# Patient Record
Sex: MALE | Race: WHITE | NOT HISPANIC OR LATINO | Employment: FULL TIME | URBAN - METROPOLITAN AREA
[De-identification: names, ages, dates, MRNs, and addresses within clinical notes are randomized per-mention and may not be internally consistent; named-entity substitution may affect disease eponyms.]

---

## 2017-07-05 ENCOUNTER — OFFICE VISIT (OUTPATIENT)
Dept: FAMILY MEDICINE | Facility: CLINIC | Age: 52
End: 2017-07-05
Payer: COMMERCIAL

## 2017-07-05 VITALS
WEIGHT: 265 LBS | OXYGEN SATURATION: 97 % | HEART RATE: 101 BPM | DIASTOLIC BLOOD PRESSURE: 88 MMHG | TEMPERATURE: 98.1 F | HEIGHT: 78 IN | BODY MASS INDEX: 30.66 KG/M2 | SYSTOLIC BLOOD PRESSURE: 122 MMHG

## 2017-07-05 DIAGNOSIS — Z80.0 FAMILY HISTORY OF COLON CANCER IN FATHER: ICD-10-CM

## 2017-07-05 DIAGNOSIS — F41.8 SITUATIONAL ANXIETY: Primary | ICD-10-CM

## 2017-07-05 DIAGNOSIS — Z23 NEED FOR PROPHYLACTIC VACCINATION WITH TETANUS-DIPHTHERIA (TD): ICD-10-CM

## 2017-07-05 DIAGNOSIS — Z13.220 LIPID SCREENING: ICD-10-CM

## 2017-07-05 DIAGNOSIS — E29.1 HYPOGONADISM MALE: ICD-10-CM

## 2017-07-05 DIAGNOSIS — Z13.0 SCREENING FOR DEFICIENCY ANEMIA: ICD-10-CM

## 2017-07-05 DIAGNOSIS — Z11.59 NEED FOR HEPATITIS C SCREENING TEST: ICD-10-CM

## 2017-07-05 DIAGNOSIS — I10 BENIGN ESSENTIAL HYPERTENSION: ICD-10-CM

## 2017-07-05 DIAGNOSIS — Z12.11 SCREEN FOR COLON CANCER: ICD-10-CM

## 2017-07-05 LAB
ALBUMIN SERPL-MCNC: 3.9 G/DL (ref 3.4–5)
ALP SERPL-CCNC: 82 U/L (ref 40–150)
ALT SERPL W P-5'-P-CCNC: 57 U/L (ref 0–70)
ANION GAP SERPL CALCULATED.3IONS-SCNC: 10 MMOL/L (ref 3–14)
AST SERPL W P-5'-P-CCNC: 21 U/L (ref 0–45)
BILIRUB SERPL-MCNC: 0.8 MG/DL (ref 0.2–1.3)
BUN SERPL-MCNC: 13 MG/DL (ref 7–30)
CALCIUM SERPL-MCNC: 9.1 MG/DL (ref 8.5–10.1)
CHLORIDE SERPL-SCNC: 107 MMOL/L (ref 94–109)
CHOLEST SERPL-MCNC: 194 MG/DL
CO2 SERPL-SCNC: 24 MMOL/L (ref 20–32)
CREAT SERPL-MCNC: 1 MG/DL (ref 0.66–1.25)
CREAT UR-MCNC: 263 MG/DL
ERYTHROCYTE [DISTWIDTH] IN BLOOD BY AUTOMATED COUNT: 13.9 % (ref 10–15)
GFR SERPL CREATININE-BSD FRML MDRD: 79 ML/MIN/1.7M2
GLUCOSE SERPL-MCNC: 111 MG/DL (ref 70–99)
HCT VFR BLD AUTO: 42.2 % (ref 40–53)
HCV AB SERPL QL IA: NORMAL
HDLC SERPL-MCNC: 42 MG/DL
HGB BLD-MCNC: 14.6 G/DL (ref 13.3–17.7)
LDLC SERPL CALC-MCNC: 122 MG/DL
MCH RBC QN AUTO: 28.9 PG (ref 26.5–33)
MCHC RBC AUTO-ENTMCNC: 34.6 G/DL (ref 31.5–36.5)
MCV RBC AUTO: 84 FL (ref 78–100)
MICROALBUMIN UR-MCNC: 20 MG/L
MICROALBUMIN/CREAT UR: 7.53 MG/G CR (ref 0–17)
NONHDLC SERPL-MCNC: 152 MG/DL
PLATELET # BLD AUTO: 162 10E9/L (ref 150–450)
POTASSIUM SERPL-SCNC: 4.4 MMOL/L (ref 3.4–5.3)
PROT SERPL-MCNC: 7.7 G/DL (ref 6.8–8.8)
RBC # BLD AUTO: 5.05 10E12/L (ref 4.4–5.9)
SODIUM SERPL-SCNC: 141 MMOL/L (ref 133–144)
TRIGL SERPL-MCNC: 151 MG/DL
TSH SERPL DL<=0.005 MIU/L-ACNC: 1.58 MU/L (ref 0.4–4)
WBC # BLD AUTO: 3.9 10E9/L (ref 4–11)

## 2017-07-05 PROCEDURE — 82043 UR ALBUMIN QUANTITATIVE: CPT | Performed by: PHYSICIAN ASSISTANT

## 2017-07-05 PROCEDURE — 84443 ASSAY THYROID STIM HORMONE: CPT | Performed by: PHYSICIAN ASSISTANT

## 2017-07-05 PROCEDURE — 85027 COMPLETE CBC AUTOMATED: CPT | Performed by: PHYSICIAN ASSISTANT

## 2017-07-05 PROCEDURE — 86803 HEPATITIS C AB TEST: CPT | Performed by: PHYSICIAN ASSISTANT

## 2017-07-05 PROCEDURE — 99203 OFFICE O/P NEW LOW 30 MIN: CPT | Performed by: PHYSICIAN ASSISTANT

## 2017-07-05 PROCEDURE — 36415 COLL VENOUS BLD VENIPUNCTURE: CPT | Performed by: PHYSICIAN ASSISTANT

## 2017-07-05 PROCEDURE — 84403 ASSAY OF TOTAL TESTOSTERONE: CPT | Performed by: PHYSICIAN ASSISTANT

## 2017-07-05 PROCEDURE — 80061 LIPID PANEL: CPT | Performed by: PHYSICIAN ASSISTANT

## 2017-07-05 PROCEDURE — 80053 COMPREHEN METABOLIC PANEL: CPT | Performed by: PHYSICIAN ASSISTANT

## 2017-07-05 PROCEDURE — 84270 ASSAY OF SEX HORMONE GLOBUL: CPT | Performed by: PHYSICIAN ASSISTANT

## 2017-07-05 RX ORDER — BUPROPION HYDROCHLORIDE 150 MG/1
150 TABLET ORAL EVERY MORNING
Qty: 90 TABLET | Refills: 1 | Status: SHIPPED | OUTPATIENT
Start: 2017-07-05 | End: 2017-08-19

## 2017-07-05 RX ORDER — SPIRONOLACTONE 50 MG/1
50 TABLET, FILM COATED ORAL DAILY
Qty: 90 TABLET | Refills: 1 | Status: SHIPPED | OUTPATIENT
Start: 2017-07-05 | End: 2017-12-28

## 2017-07-05 RX ORDER — LOSARTAN POTASSIUM 100 MG/1
100 TABLET ORAL DAILY
Qty: 90 TABLET | Refills: 1 | Status: SHIPPED | OUTPATIENT
Start: 2017-07-05 | End: 2017-12-28

## 2017-07-05 ASSESSMENT — PATIENT HEALTH QUESTIONNAIRE - PHQ9: 5. POOR APPETITE OR OVEREATING: NOT AT ALL

## 2017-07-05 ASSESSMENT — ANXIETY QUESTIONNAIRES
7. FEELING AFRAID AS IF SOMETHING AWFUL MIGHT HAPPEN: SEVERAL DAYS
GAD7 TOTAL SCORE: 2
2. NOT BEING ABLE TO STOP OR CONTROL WORRYING: NOT AT ALL
3. WORRYING TOO MUCH ABOUT DIFFERENT THINGS: NOT AT ALL
6. BECOMING EASILY ANNOYED OR IRRITABLE: SEVERAL DAYS
5. BEING SO RESTLESS THAT IT IS HARD TO SIT STILL: NOT AT ALL
1. FEELING NERVOUS, ANXIOUS, OR ON EDGE: NOT AT ALL

## 2017-07-05 NOTE — LETTER
[address] [address]  Phone: [phone]  Fax: [phone]  July 5, 2017     AUTHORIZATION TO RELEASE PROTECTED HEALTH INFORMATION    Patient Name:  Richard Ann  YOB: 1965    Hedy MRN:[id number]             This will authorize [facility]  to request information from :     [facility], [place], [place]    The following information is to be released for health maintenance and continuing care purposes with my primary care clinic:                 Visit Notes     ER Notes    Discharge Summary    -I understand that I may revoke this authorization by written request at any time to the address listed at the top of this form.  I understand that the revocation will not apply to information that has already been released in response to this authorization.    -This authorization last for one year after the date you sign it.     -[place] cannot prevent redisclosure of the information by the person or organization who receives your records under this authorization, and that information may not be covered by state and federal privacy protections after it is released. By signing this authorization, you release [place] from any and all liability resulting from a redisclosure by the recipient.    ___________________________________          _____________  Signature of Patient/Authorized Person                     Date        ____________________________________________  (Reason if patient is unable to sign)

## 2017-07-05 NOTE — PROGRESS NOTES
SUBJECTIVE:                                                    Richard Ann is a 51 year old male who presents to clinic today for the following health issues:    Hypertension Follow-up  Richard presents to clinic today to establish care. He recently relocated from [place] to the [facility]. He has historically taken losartan and spironolactone daily as prescribed to manage symptoms. States today with recent move he ran out of his medications and has not been taking medications for past ~1 week.     Outpatient blood pressures are not being checked.    Low Salt Diet: not monitoring salt  BP Readings from Last 3 Encounters:   07/05/17 122/88       Depression and Anxiety Follow-Up  Patient has PMH significant for depression. He has historically taken bupropion to manage depression for past ~2 years while going through a divorce. He reports that he is interested in stopping medication at this time. States prior to starting medication he was suffering from severe emotional swings with constant crying spells throughout the day.     Other associated symptoms:None    Complicating factors:     Significant life event: Yes-  Recent divorce     Current substance abuse: None    No flowsheet data found.  DANNIE-7 SCORE 7/5/2017   Total Score 2       PHQ-9  English  PHQ-9   Any Language  GAD7    Hormone Therapy  Per patient he has PMH significant for low testosterone. Prior to starting hormone therapy his testosterone levels were in the mid 50's. He reports that he was on hormone therapies for a brief time to improve levels. Hoping to have levels checked again today to see if he needs to restart hormones.      Altitude Poisoning   Per patient approximately ~1 month ago he was seen at an ED in [place], [place] for altitude poisoning. Patient reports that he was in [place] for work when he become symptomatic and was transported by ambulance to ED. He was treated with O2 at ED.     Of mention, the patient reports family history in father  for colon CA. Father was diagnosed at 72 y.o. and passed at 73 y.o. due to complications from CA. Today patient is requesting colonoscopy.       Amount of exercise or physical activity: 2-3 days/week for an average of 15-30 minutes    Problems taking medications regularly: No    Medication side effects: none    Diet: regular (no restrictions)     Problem list and histories reviewed & adjusted, as indicated.  Additional history: as documented    Patient Active Problem List   Diagnosis     Family history of colon cancer in father     Hypogonadism male     Benign essential hypertension     Situational anxiety     Past Surgical History:   Procedure Laterality Date     NO HISTORY OF SURGERY         Social History   Substance Use Topics     Smoking status: Never Smoker     Smokeless tobacco: Not on file     Alcohol use Yes      Comment: 4 drinks per week     Family History   Problem Relation Age of Onset     Colon Cancer Father 72      one year after diagnosis         Current Outpatient Prescriptions   Medication Sig Dispense Refill     losartan (COZAAR) 100 MG tablet Take 1 tablet (100 mg) by mouth daily 90 tablet 1     spironolactone (ALDACTONE) 50 MG tablet Take 1 tablet (50 mg) by mouth daily 90 tablet 1     buPROPion (WELLBUTRIN XL) 150 MG 24 hr tablet Take 1 tablet (150 mg) by mouth every morning 90 tablet 1     No Known Allergies    Reviewed and updated as needed this visit by clinical staff  Tobacco  Allergies  Meds  Problems  Med Hx  Surg Hx  Fam Hx  Soc Hx        Reviewed and updated as needed this visit by Provider  Tobacco  Allergies  Meds  Problems  Med Hx  Surg Hx  Fam Hx  Soc Hx          ROS:  Constitutional, HEENT, cardiovascular, pulmonary, GI, , musculoskeletal, neuro, skin, endocrine and psych systems are negative, except as otherwise noted.    This document serves as a record of the services and decisions personally performed and made by Jenise Acosta PA-C. It was created on her  "behalf by Shani Le, a trained medical scribe. The creation of this document is based the provider's statements to the medical scribe.  Shani Le, July 5, 2017 8:38 AM    OBJECTIVE:     /88 (BP Location: Right arm, Patient Position: Chair, Cuff Size: Adult Large)  Pulse 101  Temp 98.1  F (36.7  C) (Oral)  Ht 6' 6.5\" (1.994 m)  Wt 265 lb (120.2 kg)  SpO2 97%  BMI 30.23 kg/m2  Body mass index is 30.23 kg/(m^2).     GENERAL: healthy, alert and no distress  RESP: lungs clear to auscultation - no rales, rhonchi or wheezes  CV: regular rate and rhythm, normal S1 S2, no S3 or S4, no murmur, click or rub, no peripheral edema and peripheral pulses strong  NEURO: Normal strength and tone, mentation intact and speech normal  PSYCH: mentation appears normal, affect normal/bright    Diagnostic Test Results:  Results for orders placed or performed in visit on 07/05/17 (from the past 24 hour(s))   CBC with platelets   Result Value Ref Range    WBC 3.9 (L) 4.0 - 11.0 10e9/L    RBC Count 5.05 4.4 - 5.9 10e12/L    Hemoglobin 14.6 13.3 - 17.7 g/dL    Hematocrit 42.2 40.0 - 53.0 %    MCV 84 78 - 100 fl    MCH 28.9 26.5 - 33.0 pg    MCHC 34.6 31.5 - 36.5 g/dL    RDW 13.9 10.0 - 15.0 %    Platelet Count 162 150 - 450 10e9/L       ASSESSMENT/PLAN:   Richard was seen today for recheck medication.    Diagnoses and all orders for this visit:    Situational anxiety  Patient reports that he is stable and doing well. Interested in going off medication. Refilled medication today and discussed with patient going off medication to see how he does and if mood not well controlled ok to restart medication.   -     buPROPion (WELLBUTRIN XL) 150 MG 24 hr tablet; Take 1 tablet (150 mg) by mouth every morning    Benign essential hypertension  Refilled patient's medications today. He reports medications work well for him without side effect. Will follow up pending lab results.   -     losartan (COZAAR) 100 MG tablet; Take 1 tablet (100 " mg) by mouth daily  -     spironolactone (ALDACTONE) 50 MG tablet; Take 1 tablet (50 mg) by mouth daily  -     Comprehensive metabolic panel  -     Albumin Random Urine Quantitative    Lipid screening - will follow up pending lab results via [facility]  -     Lipid panel reflex to direct LDL    Screening for deficiency anemia  -     CBC with platelets    Hypogonadism male  Per patient he has history significant for low testosterone levels. Will follow up pending lab results.   -     Testosterone Free and Total  -     TSH with free T4 reflex    Family history of colon cancer in father, Screen for colon cancer  Sent patient for colonoscopy given family history for CA.  -     GASTROENTEROLOGY ADULT REF PROCEDURE ONLY    Need for hepatitis C screening test  -     Hepatitis C Screen Reflex to HCV RNA Quant and Genotype    Need for prophylactic vaccination with tetanus-diphtheria (TD)   - Will get records    The information in this document, created by the medical scribe for me, accurately reflects the services I personally performed and the decisions made by me. I have reviewed and approved this document for accuracy prior to leaving the patient care area .  Jenise Acosta PA-C July 5, 2017 8:37 AM    Jenise Acosta PA-C  [facility]

## 2017-07-05 NOTE — NURSING NOTE
"Chief Complaint   Patient presents with     Recheck Medication       Initial /88 (BP Location: Right arm, Patient Position: Chair, Cuff Size: Adult Large)  Pulse 101  Temp 98.1  F (36.7  C) (Oral)  Ht 6' 6.5\" (1.994 m)  Wt 265 lb (120.2 kg)  SpO2 97%  BMI 30.23 kg/m2 Estimated body mass index is 30.23 kg/(m^2) as calculated from the following:    Height as of this encounter: 6' 6.5\" (1.994 m).    Weight as of this encounter: 265 lb (120.2 kg).  Medication Reconciliation: complete   Csaba Mlnarik CMA    "

## 2017-07-06 ASSESSMENT — ANXIETY QUESTIONNAIRES: GAD7 TOTAL SCORE: 2

## 2017-07-07 ENCOUNTER — TELEPHONE (OUTPATIENT)
Dept: FAMILY MEDICINE | Facility: CLINIC | Age: 52
End: 2017-07-07

## 2017-07-07 LAB
SHBG SERPL-SCNC: 17 NMOL/L (ref 11–80)
TESTOST FREE SERPL-MCNC: 5.2 NG/DL (ref 4.7–24.4)
TESTOST SERPL-MCNC: 197 NG/DL (ref 240–950)

## 2017-07-07 NOTE — PROGRESS NOTES
Richard  I have reviewed your recent labs. Here are the results:    -Liver and gallbladder tests (ALT,AST, Alk phos,bilirubin) are normal.  -Kidney function (GFR) is normal.  -Sodium is normal.  -Potassium is normal.  -Glucose is slight elevated and may be sign of early diabetes (prediabetes). ADVISE:: low carbohydrate diet, exercise, try to lose weight (if necessary) and recheck glucose in 12 months. (GLU,A1C, DX: prediabetes)  -LDL(bad) cholesterol level is normal.  -HDL(good) cholesterol level is normal.  -Triglycerides are elevated which can increase your heart disease risk.  A diet high in fat and simple carbohydrates, genetics and being overweight can contribute to this. ADVISE: Exercise, weight control, and omega-3 fatty acids (fish oil) 3297-5670 mg daily are helpful to improve this.  Rechecking your cholesterol in 12 months is recommended (lipid w/ LDL reflex, DX: hypertriglyceridemia - 272.1).  -TSH (thyroid stimulating hormone) level is normal which indicates normal thyroid function.  -Normal red blood cell (hgb) levels, normal white blood cell count and normal platelet levels.  -Hepatitis C antibody screen test shows no signs of a previous hepatitis C infection.  -Microalbumin (urine protein) test is normal.  ADVISE: recheck annually  -Testosterone is low, do you happen to remember what dose you were previously on?  Did you do injections or topical formulations.  If you wanted to do injections, would you want to come in to the clinic to have them done or would you like to do them at home?  (If you want them done in the clinic you would need to use our on-site pharmacy)    For additional lab test information, labtestsonline.org is an excellent reference.      If you have any questions please do not hesitate to contact our office via phone ([phone]) or MyChart.    Jenise Acosta, MS, PASanjivC  [facility]

## 2017-07-21 ENCOUNTER — TELEPHONE (OUTPATIENT)
Dept: FAMILY MEDICINE | Facility: CLINIC | Age: 52
End: 2017-07-21

## 2017-07-21 DIAGNOSIS — T70.29XA ACUTE MOUNTAIN SICKNESS: Primary | ICD-10-CM

## 2017-07-21 DIAGNOSIS — D75.1 ACUTE MOUNTAIN SICKNESS: Primary | ICD-10-CM

## 2017-07-21 NOTE — TELEPHONE ENCOUNTER
Reason for Call:  Other call back    Detailed comments: Patient states that he is traveling to [place], [place], on Sunday for business.  He states last time he was in [place] he was rushed to the ER with altitude poisoning, and the the ER told him this may have been due to his medications.    He would like to talk to a nurse regarding this.    Additionally patient would like to talk to the nurse regarding his testosterone therapy.  Please call patient back to discuss.    Phone Number Patient can be reached at: Cell number on file:    Telephone Information:   Mobile [phone]       Best Time: Any      Can we leave a detailed message on this number? yes    Call taken on 7/21/2017 at 11:11 AM by Fior Moreno

## 2017-07-21 NOTE — TELEPHONE ENCOUNTER
RN spoke with RL and he advised he could try Diamox 125 mg BID starting 24 hours before strip and stop 2-3 days reaching peak altitude. RL okayed #10 verbally.    RN called patient to advised on recommendations.  He said he would rather not have to take anymore pills if he doesn't need to. He said he has gone to elevations of 14,000 feet with no issues.  He thinks he was dehydrated and not feeling well due to something he ate last time.  He said he will push fluids to stay hydrated and see how it goes.    His previous PCP was Dr. Mendez at [facility] in [place] at ph) [phone] so we can call on Monday to verify testosterone dose.  KARUNA for this clinic has not been scanned in to chart.    He said he travels Monday through Thursday and is home on Fridays.  Can reach him on his cell at anytime.      JOSELYN Priest, RN, [facility] Triage  ) [phone]

## 2017-07-21 NOTE — TELEPHONE ENCOUNTER
RN spoke with patient.   He said that he was he was in [place] a few months ago and developed altitude poisoning and was rushed to the ER.  He said they advised him it could have been a combination of things including his medications.    He wants to know what he can do to prevent this as he is traveling to [place] again this Sunday.    He also notes he was advised to update on testosterone therapy and he would like to get injections again as that is what he was getting in [place].      Penny Townsend, JOSELYN, RN, N  [facility]) [phone]

## 2017-07-21 NOTE — TELEPHONE ENCOUNTER
Please advise on altitude sickness prevention for trip this weekend.    I will check on KARUNA.    Thank you,    Penny Townsend, JOSELYN, RN, PHN  [facility]  Ph) [phone]

## 2017-07-22 RX ORDER — ACETAZOLAMIDE 125 MG/1
125 TABLET ORAL 2 TIMES DAILY
Qty: 10 TABLET | Refills: 0 | Status: SHIPPED | OUTPATIENT
Start: 2017-07-22 | End: 2017-08-19

## 2017-07-22 NOTE — TELEPHONE ENCOUNTER
Reason for Call:  Medication or medication refill:    Do you use a [facility]?  Name of the pharmacy and phone number for the current request:  [facility] - [phone]    Name of the medication requested: Diamox 125 mg for altitude sickness    Other request: would like today as he is going to [place] tomorrow    Can we leave a detailed message on this number? YES    Phone number patient can be reached at: Cell number on file:    Telephone Information:   Mobile [phone]       Best Time: any    Call taken on 7/22/2017 at 9:31 AM by Annika Fernandez

## 2017-07-22 NOTE — TELEPHONE ENCOUNTER
Chart reviewed.  Rx sent to pt's preferred pharmacy.    [facility] [address] [facility] [address] AT [facility] [address]    Alex Duarte MD

## 2017-08-03 ENCOUNTER — HOSPITAL ENCOUNTER (OUTPATIENT)
Facility: CLINIC | Age: 52
Discharge: HOME | End: 2017-08-03
Attending: INTERNAL MEDICINE | Admitting: INTERNAL MEDICINE
Payer: COMMERCIAL

## 2017-08-03 VITALS
SYSTOLIC BLOOD PRESSURE: 127 MMHG | OXYGEN SATURATION: 92 % | RESPIRATION RATE: 19 BRPM | DIASTOLIC BLOOD PRESSURE: 85 MMHG

## 2017-08-03 LAB — COLONOSCOPY: NORMAL

## 2017-08-03 PROCEDURE — G0500 MOD SEDAT ENDO SERVICE >5YRS: HCPCS | Performed by: INTERNAL MEDICINE

## 2017-08-03 PROCEDURE — 88305 TISSUE EXAM BY PATHOLOGIST: CPT | Mod: 26 | Performed by: INTERNAL MEDICINE

## 2017-08-03 PROCEDURE — 45385 COLONOSCOPY W/LESION REMOVAL: CPT | Performed by: INTERNAL MEDICINE

## 2017-08-03 PROCEDURE — 25000128 H RX IP 250 OP 636: Performed by: INTERNAL MEDICINE

## 2017-08-03 PROCEDURE — 88305 TISSUE EXAM BY PATHOLOGIST: CPT | Performed by: INTERNAL MEDICINE

## 2017-08-03 RX ORDER — FENTANYL CITRATE 50 UG/ML
INJECTION, SOLUTION INTRAMUSCULAR; INTRAVENOUS PRN
Status: DISCONTINUED | OUTPATIENT
Start: 2017-08-03 | End: 2017-08-03 | Stop reason: HOSPADM

## 2017-08-03 RX ORDER — ONDANSETRON 4 MG/1
4 TABLET, ORALLY DISINTEGRATING ORAL EVERY 6 HOURS PRN
Status: DISCONTINUED | OUTPATIENT
Start: 2017-08-03 | End: 2017-08-03 | Stop reason: HOSPADM

## 2017-08-03 RX ORDER — LIDOCAINE 40 MG/G
CREAM TOPICAL
Status: DISCONTINUED | OUTPATIENT
Start: 2017-08-03 | End: 2017-08-03 | Stop reason: HOSPADM

## 2017-08-03 RX ORDER — FLUMAZENIL 0.1 MG/ML
0.2 INJECTION, SOLUTION INTRAVENOUS
Status: DISCONTINUED | OUTPATIENT
Start: 2017-08-03 | End: 2017-08-03 | Stop reason: HOSPADM

## 2017-08-03 RX ORDER — ONDANSETRON 2 MG/ML
4 INJECTION INTRAMUSCULAR; INTRAVENOUS
Status: DISCONTINUED | OUTPATIENT
Start: 2017-08-03 | End: 2017-08-03 | Stop reason: HOSPADM

## 2017-08-03 RX ORDER — ONDANSETRON 2 MG/ML
4 INJECTION INTRAMUSCULAR; INTRAVENOUS EVERY 6 HOURS PRN
Status: DISCONTINUED | OUTPATIENT
Start: 2017-08-03 | End: 2017-08-03 | Stop reason: HOSPADM

## 2017-08-03 RX ORDER — NALOXONE HYDROCHLORIDE 0.4 MG/ML
.1-.4 INJECTION, SOLUTION INTRAMUSCULAR; INTRAVENOUS; SUBCUTANEOUS
Status: DISCONTINUED | OUTPATIENT
Start: 2017-08-03 | End: 2017-08-03 | Stop reason: HOSPADM

## 2017-08-03 NOTE — LETTER
July 11, 2017              Dear Richard ROWELL    Thank you for choosing [facility]. You are scheduled for the following service.     Date:  August 3rd, 2017 - Thursday             Procedure:  COLONOSCOPY  Doctor:        Sohan Patel   Arrival Time:  7:30 am  *check in at Emergency/Endoscopy desk*  Procedure Time:  8:00 am    Location:   [facility]        Endoscopy Department, First Floor (Enter through ER Doors) *        [address] [place], [place] [address]      [phone] or [phone] () to reschedule        Colonoscopy is the most accurate test to detect colon polyps and colon cancer; and the only test where polyps can be removed. During this procedure, a doctor examines the lining of your large intestine and rectum through a flexible tube.     Transportation  Arrange for a ride for the day of your procedure with a responsible adult.  A taxi ride is not an option unless you are accompanied by a responsible adult. If you fail to arrange transportation with a responsible adult, your procedure will be cancelled and rescheduled.    MIRALAX -GATORADE  PREP    Purchase the following supplies at your local pharmacy:  - 2 (two) bisacodyl tablets: each tablet contains 5 mg.  (Dulcolax  laxative NOT Dulcolax  stool softener)   - 1 (one) 8.3 oz bottle of Polyethylene Glycol (PEG) 3350 Powder   (MiraLAX , Smooth LAX , ClearLAX  or equivalent)  - 64 oz Gatorade    Regular Gatorade, Gatorade G2 , Powerade , Powerade Zero  or Pedialyte  is acceptable. Red colored flavors are not allowed; all other colors (yellow, green, orange, purple and blue) are okay. It is also okay to buy two 2.12 oz packets of powdered Gatorade that can be mixed with water to a total volume of 64 oz of liquid.  - 1 (one) 10 oz bottle of Magnesium Citrate (Red colored flavors are not allowed)  It is also okay for you to use a 0.5 oz package of powdered magnesium citrate (17 g) mixed with  10 oz of water.      PREPARATION FOR COLONOSCOPY    7 days before:    Discontinue fiber supplements and medications containing iron. This includes Metamucil  and Fibercon ; and multivitamins with iron.  3 days before:    Begin a low-fiber diet. A low-fiber diet helps making the cleanout more effective.     Examples of a low-fiber diet include (but are not limited to): white bread, white rice, pasta, crackers, fish, chicken, eggs, ground beef, creamy peanut butter, cooked/steamed/boiled vegetables, canned fruit, bananas, melons, milk, plain yogurt cheese, salad dressing and other condiments.     The following are not allowed on a low-fiber diet: seeds, nuts, popcorn, bran, whole wheat, corn, quinoa, raw fruits and vegetables, berries and dried fruit, beans and lentils.    For additional details on low-fiber diet, please refer to the table on the last page.  2 days before:    Continue the low-fiber diet.     Drink at least 8 glasses of water throughout the day.     Stop eating solid foods at 11:45 pm.    1 day before:    In the morning: begin a clear liquid diet (liquids you can see through).     Examples of a clear liquid diet include: water, clear broth or bouillon, Gatorade, Pedialyte or Powerade, carbonated and non-carbonated soft drinks (Sprite , 7-Up , ginger ale), strained fruit juices without pulp (apple, white grape, white cranberry), Jell-O  and popsicles.     The following are not allowed on a clear liquid diet: red liquids, alcoholic beverages, coffee, dairy products (milk, creamer, and yogurt), protein shakes, creamy broths, juice with pulp and chewing tobacco.    At noon: take 2 (two) bisacodyl tablets     At 4 (and no later than 6pm): start drinking the Miralax-Gatorade preparation (8.3 oz of Miralax mixed with 64 oz of Gatorade in a large pitcher). Drink 1(one) 8 oz glass every 15 minutes thereafter, until the mixture is gone.    COLON CLEANSING TIPS: drink adequate amounts of fluids before and after  your colon cleansing to prevent dehydration. Stay near a toilet because you will have diarrhea. Even if you are sitting on the toilet, continue to drink the cleansing solution every 15 minutes. If you feel nauseous or vomit, rinse your mouth with water, take a 15 to 30-minute-break and then continue drinking the solution. You will be uncomfortable until the stool has flushed from your colon (in about 2 to 4 hours). You may feel chilled.    Day of your procedure  You may take all of your morning medications including blood pressure medications, blood thinners (if you have not been instructed to stop these by our office), methadone, anti-seizure medications with sips of water 3 hours prior to your procedure or earlier. Do not take insulin or vitamins prior to your procedure. Continue the clear liquid diet.   4 hours prior: drink 10 oz of magnesium citrate. It may be easier to drink it with a straw.    STOP consuming all liquids after that.     Do not take anything by mouth during this time.     Allow extra time to travel to your procedure as you may need to stop and use a restroom along the way.  You are ready for the procedure, if you followed all instructions and your stool is no longer formed, but clear or yellow liquid. If you are unsure whether your colon is clean, please call our office at [phone] before you leave for your appointment.  Bring the following to your procedure:  - Insurance Card/Photo ID.   - List of current medications including over-the-counter medications and supplements.   - Your rescue inhaler if you currently use one to control asthma.      Canceling or rescheduling your appointment:   If you must cancel or reschedule your appointment, please call [phone] as soon as possible.    COLONOSCOPY PRE-PROCEDURE CHECKLIST  If you have diabetes, ask your regular doctor for diet and medication restrictions.  If you take an anticoagulant or anti-platelet medication (such as Coumadin ,  Lovenox , Pradaxa , Xarelto , Eliquis , etc.), please call your primary doctor for advice on holding this medication.  If you take aspirin you may continue to do so.  If you are or may be pregnant, please discuss the risks and benefits of this procedure with your doctor.        What happens during a colonoscopy?    Plan to spend up to two hours, starting at registration time, at the endoscopy center the day of your procedure. The colonoscopy takes an average of 15 to 30 minutes. Recovery time is about 30 minutes.    Before the exam:    You will change into a gown.    Your medical history and medication list will be reviewed with you, unless that has been done over the phone prior to the procedure.     A nurse will insert an intravenous (IV) line into your hand or arm.    The doctor will meet with you and will give you a consent form to sign.    During the exam:     Medicine will be given through the IV line to help you relax.     Your heart rate and oxygen levels will be monitored. If your blood pressure is low, you may be given fluids through the IV line.     The doctor will insert a flexible hollow tube, called a colonoscope, into your rectum. The scope will be advanced slowly through the large intestine (colon).    You may have a feeling of fullness or pressure.     If an abnormal tissue or a polyp is found, the doctor may remove it through the endoscope for closer examination, or biopsy. Tissue removal is painless    After the exam:           Any tissue samples removed during the exam will be sent to a lab for evaluation. It may take 5-7 working days for you to be notified of the results.     A nurse will provide you with complete discharge instructions before you leave the endoscopy center. Be sure to ask the nurse for specific instructions if you take blood thinners such as Aspirin, Coumadin or Plavix.     The doctor will prepare a full report for you and for the physician who referred you for the procedure.      Your doctor will talk with you about the initial results of your exam.      Medication given during the exam will prohibit you from driving for the rest of the day.     Following the exam, you may resume your normal diet. Your first meal should be light, no greasy foods. Avoid alcohol until the next day.     You may resume your regular activities the day after the procedure.                   LOW-FIBER DIET    Foods RECOMMENDED Foods to AVOID   Breads, Cereal, Rice and Pasta:   White bread, rolls, biscuits, croissant and giuliano toast.   Waffles, Bruneian toast and pancakes.   White rice, noodles, pasta, macaroni and peeled cooked potatoes.   Plain crackers and saltines.   Cooked cereals: farina, cream of rice.   Cold cereals: Puffed Rice , Rice Krispies , Corn Flakes  and Special K    Breads, Cereal, Rice and Pasta:   Breads or rolls with nuts, seeds or fruit.   Whole wheat, pumpernickel, rye breads and cornbread.   Potatoes with skin, brown or wild rice, and kasha (buckwheat).     Vegetables:   Tender cooked and canned vegetables without seeds: carrots, asparagus tips, green or wax beans, pumpkin, spinach, lima beans. Vegetables:   Raw or steamed vegetables.   Vegetables with seeds.   Sauerkraut.   Winter squash, peas, broccoli, Brussel sprouts, cabbage, onions, cauliflower, baked beans, peas and corn.   Fruits:   Strained fruit juice.   Canned fruit, except pineapple.   Ripe bananas and melon. Fruits:   Prunes and prune juice.   Raw fruits.   Dried fruits: figs, dates and raisins.   Milk/Dairy:   Milk: plain or flavored.   Yogurt, custard and ice cream.   Cheese and cottage cheese Milk/Dairy:     Meat and other proteins:   ground, well-cooked tender beef, lamb, ham, veal, pork, fish, poultry and organ meats.   Eggs.   Peanut butter without nuts. Meat and other proteins:   Tough, fibrous meats with gristle.   Dry beans, peas and lentils.   Peanut butter with nuts.   Tofu.   Fats, Snack, Sweets, Condiments and  Beverages:   Margarine, butter, oils, mayonnaise, sour cream and salad dressing, plain gravy.   Sugar, hard candy, clear jelly, honey and syrup.   Spices, cooked herbs, bouillon, broth and soups made with allowed vegetable, ketchup and mustard.   Coffee, tea and carbonated drinks.   Plain cakes, cookies and pretzels.   Gelatin, plain puddings, custard, ice cream, sherbet and popsicles. Fats, Snack, Sweets, Condiments and Beverages:   Nuts, seeds and coconut.   Jam, marmalade and preserves.   Pickles, olives, relish and horseradish.   All desserts containing nuts, seeds, dried fruit and coconut; or made from whole grains or bran.   Candy made with nuts or seeds.   Popcorn.           DIRECTIONS TO THE ENDOSCOPY DEPARTMENT     From the north ([facility])  Take [address] South, exit on [address] [address]. Get into the left hand isela, turn left (east), go one-half mile to [address] and turn left. Go north to the first stoplight, take a right on [address] and follow it to the Emergency entrance.    From the south ([facility])  Take 35N to the 35E split and exit on [address] [address]. On [address] , turn left (west) to [address]. Turn right (north) on [address]. Go north to the first stoplight, take a right on [address] and follow it to the Emergency entrance.    From the east via 35E ([facility])  Take [address]E south to [address] [address] exit. Turn right on [address] . Go west to [address]. Turn right (north) on [address]. Go to the first stoplight, take a right and follow on [address] to the Emergency entrance.    From the east via [address] ([facility])  Take [address] to [address]. Turn left (south) on [address] to [address]. Turn left (east) on [address] and follow it to the Emergency entrance.    From the west via Highway 13 ([place])  Take [address] east to [address]. Turn right (south) on  [address] to [address]. Turn left (east) on [address] and follow it to the Emergency entrance.

## 2017-08-03 NOTE — H&P
Pre-Endoscopy History and Physical     Richard Ann MRN# [id number]   YOB: 1965 Age: 51 year old     Date of Procedure: 8/3/2017  Primary care provider: No primary care provider on file.  Type of Endoscopy: Colonoscopy with possible biopsy, possible polypectomy  Reason for Procedure: screen  Type of Anesthesia Anticipated: Conscious Sedation    HPI:    Richard is a 51 year old male who will be undergoing the above procedure.      A history and physical has been performed. The patient's medications and allergies have been reviewed. The risks and benefits of the procedure and the sedation options and risks were discussed with the patient.  All questions were answered and informed consent was obtained.      He denies a personal or family history of anesthesia complications or bleeding disorders.     Patient Active Problem List   Diagnosis     Family history of colon cancer in father     Hypogonadism male     Benign essential hypertension     Situational anxiety        Past Medical History:   Diagnosis Date     Benign essential hypertension 2017     Family history of colon cancer in father 2017     Hypogonadism male 2017    [place], has been off x 1 year (as of 2017)     Situational anxiety 2017    due to divorce        Past Surgical History:   Procedure Laterality Date     NO HISTORY OF SURGERY         Social History   Substance Use Topics     Smoking status: Never Smoker     Smokeless tobacco: Never Used     Alcohol use Yes      Comment: 4 drinks per week       Family History   Problem Relation Age of Onset     Colon Cancer Father 72      one year after diagnosis       Prior to Admission medications    Medication Sig Start Date End Date Taking? Authorizing Provider   Omeprazole (PRILOSEC PO) Take 20 mg by mouth   Yes Reported, Patient   losartan (COZAAR) 100 MG tablet Take 1 tablet (100 mg) by mouth daily 17  Yes Jenise Acosta PA-C  "  spironolactone (ALDACTONE) 50 MG tablet Take 1 tablet (50 mg) by mouth daily 7/5/17  Yes Jenise Acosta PA-C   acetaZOLAMIDE (DIAMOX) 125 MG tablet Take 1 tablet (125 mg) by mouth 2 times daily Start day before ascending to higher altitude 7/22/17   Alex Duarte Jr., MD   buPROPion (WELLBUTRIN XL) 150 MG 24 hr tablet Take 1 tablet (150 mg) by mouth every morning 7/5/17   Jenise Acosta PA-C       No Known Allergies     REVIEW OF SYSTEMS:   5 point ROS negative except as noted above in HPI, including Gen., Resp., CV, GI &  system review.    PHYSICAL EXAM:   There were no vitals taken for this visit. Estimated body mass index is 30.23 kg/(m^2) as calculated from the following:    Height as of 7/5/17: 1.994 m (6' 6.5\").    Weight as of 7/5/17: 120.2 kg (265 lb).   GENERAL APPEARANCE: alert, and oriented  MENTAL STATUS: alert  AIRWAY EXAM: Mallampatti Class I (visualization of the soft palate, fauces, uvula, anterior and posterior pillars)  RESP: lungs clear to auscultation - no rales, rhonchi or wheezes  CV: regular rates and rhythm  DIAGNOSTICS:    Not indicated    IMPRESSION   ASA Class 1 - Healthy patient, no medical problems    PLAN:   Plan for Colonoscopy with possible biopsy, possible polypectomy. We discussed the risks, benefits and alternatives and the patient wished to proceed.    The above has been forwarded to the consulting provider.      Signed Electronically by: Sohan Patel  August 3, 2017          "

## 2017-08-04 LAB — COPATH REPORT: NORMAL

## 2017-08-19 ENCOUNTER — OFFICE VISIT (OUTPATIENT)
Dept: FAMILY MEDICINE | Facility: CLINIC | Age: 52
End: 2017-08-19
Payer: COMMERCIAL

## 2017-08-19 VITALS
WEIGHT: 261 LBS | TEMPERATURE: 98.5 F | DIASTOLIC BLOOD PRESSURE: 88 MMHG | BODY MASS INDEX: 40.97 KG/M2 | SYSTOLIC BLOOD PRESSURE: 122 MMHG | OXYGEN SATURATION: 97 % | HEART RATE: 102 BPM | HEIGHT: 67 IN

## 2017-08-19 DIAGNOSIS — I10 HYPERTENSION GOAL BP (BLOOD PRESSURE) < 140/90: ICD-10-CM

## 2017-08-19 DIAGNOSIS — J20.9 ACUTE BRONCHITIS WITH SYMPTOMS > 10 DAYS: Primary | ICD-10-CM

## 2017-08-19 PROCEDURE — 99213 OFFICE O/P EST LOW 20 MIN: CPT | Performed by: FAMILY MEDICINE

## 2017-08-19 RX ORDER — AZITHROMYCIN 250 MG/1
TABLET, FILM COATED ORAL
Qty: 6 TABLET | Refills: 0 | Status: SHIPPED | OUTPATIENT
Start: 2017-08-19 | End: 2017-08-19

## 2017-08-19 RX ORDER — ALBUTEROL SULFATE 90 UG/1
2 AEROSOL, METERED RESPIRATORY (INHALATION) EVERY 6 HOURS PRN
Qty: 1 INHALER | Refills: 0 | Status: SHIPPED | OUTPATIENT
Start: 2017-08-19 | End: 2017-08-19

## 2017-08-19 RX ORDER — ALBUTEROL SULFATE 90 UG/1
2 AEROSOL, METERED RESPIRATORY (INHALATION) EVERY 6 HOURS PRN
Qty: 1 INHALER | Refills: 0 | Status: SHIPPED | OUTPATIENT
Start: 2017-08-19 | End: 2018-04-03

## 2017-08-19 RX ORDER — AZITHROMYCIN 250 MG/1
TABLET, FILM COATED ORAL
Qty: 6 TABLET | Refills: 0 | Status: SHIPPED | OUTPATIENT
Start: 2017-08-19 | End: 2018-04-03

## 2017-08-19 NOTE — MR AVS SNAPSHOT
After Visit Summary   8/19/2017    Richard Ann    MRN: [id number]           Patient Information     Date Of Birth          1965        Visit Information        Provider Department      8/19/2017 11:00 AM Hernandez Amador MD [facility]        Today's Diagnoses     Acute bronchitis with symptoms > 10 days    -  1    Hypertension goal BP (blood pressure) < 140/90          Care Instructions        [facility]                        To reach your care team during and after hours:   [phone]  To reach our pharmacy:        [phone]    Clinic Hours                        Our clinic hours are:    Monday   7:30 am to 7:00 pm                  Tuesday through Friday 7:30 am to 5:00 pm                             Saturday   8:00 am to 12:00 pm      Sunday   Closed      Pharmacy Hours                        Our pharmacy hours are:    Monday   8:30 am to 7:00 pm       Tuesday to Friday  8:30 am to 6:00 pm                       Saturday    9:00 am to 1:00 pm              Sunday    Closed              There is also information available at our web site:  [email or web address]    If your provider ordered any lab tests and you do not receive the results within 10 business days, please call the clinic.    If you need a medication refill please contact your pharmacy.  Please allow 2-3 business days for your refill to be completed.    Our clinic offers telephone visits and e visits.  Please ask one of your team members to explain more.      Use [employer] (secure email communication and access to your chart) to send your primary care provider a message or make an appointment. Ask someone on your Team how to sign up for [employer].  Immunizations                        There is no immunization history on file for this patient.     Health Maintenance                         Health Maintenance Due   Topic Date Due     Tetanus Vaccine - every 10 years  08/22/1983               Follow-ups  "after your visit        Who to contact     If you have questions or need follow up information about today's clinic visit or your schedule please contact [facility] directly at [phone].  Normal or non-critical lab and imaging results will be communicated to you by MyChart, letter or phone within 4 business days after the clinic has received the results. If you do not hear from us within 7 days, please contact the clinic through [employer] or phone. If you have a critical or abnormal lab result, we will notify you by phone as soon as possible.  Submit refill requests through Arigo or call your pharmacy and they will forward the refill request to us. Please allow 3 business days for your refill to be completed.          Additional Information About Your Visit        [employer] Information     Arigo gives you secure access to your electronic health record. If you see a primary care provider, you can also send messages to your care team and make appointments. If you have questions, please call your primary care clinic.  If you do not have a primary care provider, please call [phone] and they will assist you.        Care EveryWhere ID     This is your Care EveryWhere ID. This could be used by other organizations to access your [facility] [place] medical records  [id number]        Your Vitals Were     Pulse Temperature Height Pulse Oximetry BMI (Body Mass Index)       102 98.5  F (36.9  C) (Oral) 5' 6.5\" (1.689 m) 97% 41.5 kg/m2        Blood Pressure from Last 3 Encounters:   08/19/17 122/88   08/03/17 127/85   07/05/17 122/88    Weight from Last 3 Encounters:   08/19/17 261 lb (118.4 kg)   07/05/17 265 lb (120.2 kg)              Today, you had the following     No orders found for display         Today's Medication Changes          These changes are accurate as of: 8/19/17 11:43 AM.  If you have any questions, ask your nurse or doctor.               Start taking these medicines.        Dose/Directions    " albuterol 108 (90 BASE) MCG/ACT Inhaler   Commonly known as:  PROAIR HFA/PROVENTIL HFA/VENTOLIN HFA   Used for:  Acute bronchitis with symptoms > 10 days   Started by:  Hernandez Amador MD        Dose:  2 puff   Inhale 2 puffs into the lungs every 6 hours as needed for shortness of breath / dyspnea or wheezing   Quantity:  1 Inhaler   Refills:  0       azithromycin 250 MG tablet   Commonly known as:  ZITHROMAX   Used for:  Acute bronchitis with symptoms > 10 days   Started by:  Hernandez Amador MD        2 tabs day 1 and the 1 tab daily for 4 more days   Quantity:  6 tablet   Refills:  0            Where to get your medicines      These medications were sent to [facility] [address]  [address], [facility] [address]     Phone:  [phone]     albuterol 108 (90 BASE) MCG/ACT Inhaler    azithromycin 250 MG tablet                Primary Care Provider    None Specified       No primary provider on file.        Equal Access to Services     ONUR [facility] CenterHERNANDEZ : Tonia arvizuo Soмарина, waaxda luqadaha, qaybta kaalmada adeteresayasofia, darwin valenzuela . So [facility] [phone].    ATENCIÓN: Si habla español, tiene a martin disposición servicios gratuitos de asistencia lingüística. Llame al [phone].    We comply with applicable federal civil rights laws and [place] laws. We do not discriminate on the basis of race, color, national origin, age, disability sex, sexual orientation or gender identity.            Thank you!     Thank you for choosing [facility]  for your care. Our goal is always to provide you with excellent care. Hearing back from our patients is one way we can continue to improve our services. Please take a few minutes to complete the written survey that you may receive in the mail after your visit with us. Thank you!             Your Updated Medication List - Protect others around you: Learn how to safely use,  store and throw away your medicines at www.disposemymeds.org.          This list is accurate as of: 8/19/17 11:43 AM.  Always use your most recent med list.                   Brand Name Dispense Instructions for use Diagnosis    albuterol 108 (90 BASE) MCG/ACT Inhaler    PROAIR HFA/PROVENTIL HFA/VENTOLIN HFA    1 Inhaler    Inhale 2 puffs into the lungs every 6 hours as needed for shortness of breath / dyspnea or wheezing    Acute bronchitis with symptoms > 10 days       azithromycin 250 MG tablet    ZITHROMAX    6 tablet    2 tabs day 1 and the 1 tab daily for 4 more days    Acute bronchitis with symptoms > 10 days       losartan 100 MG tablet    COZAAR    90 tablet    Take 1 tablet (100 mg) by mouth daily    Benign essential hypertension       PRILOSEC PO      Take 20 mg by mouth        spironolactone 50 MG tablet    ALDACTONE    90 tablet    Take 1 tablet (50 mg) by mouth daily    Benign essential hypertension

## 2017-08-19 NOTE — PATIENT INSTRUCTIONS
[facility]                        To reach your care team during and after hours:   [phone]  To reach our pharmacy:        [phone]    Clinic Hours                        Our clinic hours are:    Monday   7:30 am to 7:00 pm                  Tuesday through Friday 7:30 am to 5:00 pm                             Saturday   8:00 am to 12:00 pm      Sunday   Closed      Pharmacy Hours                        Our pharmacy hours are:    Monday   8:30 am to 7:00 pm       Tuesday to Friday  8:30 am to 6:00 pm                       Saturday    9:00 am to 1:00 pm              Sunday    Closed              There is also information available at our web site:  [email or web address]    If your provider ordered any lab tests and you do not receive the results within 10 business days, please call the clinic.    If you need a medication refill please contact your pharmacy.  Please allow 2-3 business days for your refill to be completed.    Our clinic offers telephone visits and e visits.  Please ask one of your team members to explain more.      Use [employer]an[employer] (secure email communication and access to your chart) to send your primary care provider a message or make an appointment. Ask someone on your Team how to sign up for [employer].  Immunizations                        There is no immunization history on file for this patient.     Health Maintenance                         Health Maintenance Due   Topic Date Due     Tetanus Vaccine - every 10 years  08/22/1983

## 2017-08-19 NOTE — PROGRESS NOTES
SUBJECTIVE:   Richard Ann is a 51 year old male who presents to clinic today for the following health issues:    Acute Illness     Acute illness concerns: URI    Onset: 3 weeks      Fever: no    Chills/Sweats: no    Headache (location?): no    Sinus Pressure:no    Conjunctivitis:  no    Ear Pain: no    Rhinorrhea: no     Congestion: YES    Sore Throat: no      Cough: YES-productive of white sputum - green in the morning    Wheeze: YES, at times, SOB at times    Decreased Appetite: no    Nausea: no    Vomiting: no    Diarrhea:  no    Dysuria/Freq.: no    Fatigue/Achiness: no    Sick/Strep Exposure: no    No known allergy sx     Therapies Tried and outcome: nothing    Problem list and histories reviewed & adjusted, as indicated.  Additional history: as documented    Reviewed and updated as needed this visit by clinical staffTobacco  Allergies  Meds  Med Hx  Surg Hx  Fam Hx  Soc Hx      Reviewed and updated as needed this visit by Provider       BP Readings from Last 3 Encounters:   08/19/17 122/88   08/03/17 127/85   07/05/17 122/88       Wt Readings from Last 4 Encounters:   08/19/17 261 lb (118.4 kg)   07/05/17 265 lb (120.2 kg)       Health Maintenance    Health Maintenance Due   Topic Date Due     TETANUS IMMUNIZATION (SYSTEM ASSIGNED)  08/22/1983       Current Problem List    Patient Active Problem List   Diagnosis     Family history of colon cancer in father     Hypogonadism male     Benign essential hypertension     Situational anxiety     Hypertension goal BP (blood pressure) < 140/90       Past Medical History    Past Medical History:   Diagnosis Date     Benign essential hypertension 7/5/2017     Family history of colon cancer in father 7/5/2017     Hypogonadism male 07/05/2017    [place], has been off x 1 year (as of 7/2017)     Situational anxiety 07/05/2017    due to divorce       Past Surgical History    Past Surgical History:   Procedure Laterality Date     NO HISTORY OF  SURGERY         Current Medications    Current Outpatient Prescriptions   Medication Sig Dispense Refill     azithromycin (ZITHROMAX) 250 MG tablet 2 tabs day 1 and the 1 tab daily for 4 more days 6 tablet 0     albuterol (PROAIR HFA/PROVENTIL HFA/VENTOLIN HFA) 108 (90 BASE) MCG/ACT Inhaler Inhale 2 puffs into the lungs every 6 hours as needed for shortness of breath / dyspnea or wheezing 1 Inhaler 0     Omeprazole (PRILOSEC PO) Take 20 mg by mouth       losartan (COZAAR) 100 MG tablet Take 1 tablet (100 mg) by mouth daily 90 tablet 1     spironolactone (ALDACTONE) 50 MG tablet Take 1 tablet (50 mg) by mouth daily 90 tablet 1     [DISCONTINUED] albuterol (PROAIR HFA/PROVENTIL HFA/VENTOLIN HFA) 108 (90 BASE) MCG/ACT Inhaler Inhale 2 puffs into the lungs every 6 hours as needed for shortness of breath / dyspnea or wheezing 1 Inhaler 0       Allergies    No Known Allergies    Immunizations      There is no immunization history on file for this patient.    Family History    Family History   Problem Relation Age of Onset     Colon Cancer Father 72      one year after diagnosis       Social History    Social History     Social History     Marital status: Single     Spouse name: N/A     Number of children: 2     Years of education: N/A     Occupational History      [employer]     Social History Main Topics     Smoking status: Never Smoker     Smokeless tobacco: Never Used     Alcohol use Yes      Comment: 4 drinks per week     Drug use: No     Sexual activity: Yes     Partners: Female     Other Topics Concern     Not on file     Social History Narrative       All above reviewed and updated, all stable unless otherwise noted    Recent labs reviewed    ROS:  C: NEGATIVE for fever, chills, change in weight  I: NEGATIVE for worrisome rashes, moles or lesions  E: NEGATIVE for vision changes or irritation  E/M: NEGATIVE for ear, mouth and throat problems  R: NEGATIVE for significant cough or SOB  CV: NEGATIVE for  "chest pain, palpitations or peripheral edema  GI: NEGATIVE for nausea, abdominal pain, heartburn, or change in bowel habits  : NEGATIVE for frequency, dysuria, or hematuria  M: NEGATIVE for significant arthralgias or myalgia  N: NEGATIVE for weakness, dizziness or paresthesias  E: NEGATIVE for temperature intolerance, skin/hair changes  H: NEGATIVE for bleeding problems  P: NEGATIVE for changes in mood or affect    OBJECTIVE:                                                    /88  Pulse 102  Temp 98.5  F (36.9  C) (Oral)  Ht 5' 6.5\" (1.689 m)  Wt 261 lb (118.4 kg)  SpO2 97%  BMI 41.5 kg/m2  Body mass index is 41.5 kg/(m^2).  GENERAL: healthy, alert and no distress  EYES: Eyes grossly normal to inspection, extraocular movements - intact, and PERRL  HENT: ear canals- normal; TMs- normal; Nose- normal; Mouth- no ulcers, no lesions  NECK: no tenderness, no adenopathy, no asymmetry, no masses, no stiffness; thyroid- normal to palpation  RESP: lungs clear to auscultation - no rales, no rhonchi, no wheezes  CV: regular rates and rhythm, normal S1 S2, no S3 or S4 and no murmur, no click or rub -  ABDOMEN: soft, no tenderness, no  hepatosplenomegaly, no masses, normal bowel sounds  MS: extremities- no gross deformities noted, no edema  SKIN: no suspicious lesions, no rashes  NEURO: strength and tone- normal, sensory exam- grossly normal, mentation- intact, speech- normal, reflexes- symmetric  BACK: no CVA tenderness, no paralumbar tenderness  PSYCH: Alert and oriented times 3; speech- coherent , normal rate and volume; able to articulate logical thoughts, able to abstract reason, no tangential thoughts, no hallucinations or delusions, affect- normal    DIAGNOSTICS/PROCEDURES:                                                      none      ASSESSMENT/PLAN:                                                        ICD-10-CM    1. Acute bronchitis with symptoms > 10 days J20.9 azithromycin (ZITHROMAX) 250 MG tablet    "  albuterol (PROAIR HFA/PROVENTIL HFA/VENTOLIN HFA) 108 (90 BASE) MCG/ACT Inhaler     DISCONTINUED: azithromycin (ZITHROMAX) 250 MG tablet     DISCONTINUED: albuterol (PROAIR HFA/PROVENTIL HFA/VENTOLIN HFA) 108 (90 BASE) MCG/ACT Inhaler   2. Hypertension goal BP (blood pressure) < 140/90 I10        Discussed treatment/modality options, including risk and benefits, he desires zithromax, albuterol, sx cares, close follow up, cpx fasting soon, will review with LP, testosterone, watch BP. All diagnosis above reviewed and noted above, otherwise stable.  See [employer] orders for further details.  Follow up as needed.    Health Maintenance Due   Topic Date Due     TETANUS IMMUNIZATION (SYSTEM ASSIGNED)  08/22/1983       See Patient Instructions           Hernandez Amador MD [address]  [address] [phone] Fax

## 2017-08-19 NOTE — NURSING NOTE
"Chief Complaint   Patient presents with     URI       Initial /88  Pulse 102  Temp 98.5  F (36.9  C) (Oral)  Ht 6' 6.5\" (1.994 m)  Wt 261 lb (118.4 kg)  SpO2 97%  BMI 29.78 kg/m2 Estimated body mass index is 29.78 kg/(m^2) as calculated from the following:    Height as of this encounter: 6' 6.5\" (1.994 m).    Weight as of this encounter: 261 lb (118.4 kg)..  BP completed using cuff size: silverio Nickerson MA  "

## 2017-09-05 ENCOUNTER — TELEPHONE (OUTPATIENT)
Dept: FAMILY MEDICINE | Facility: CLINIC | Age: 52
End: 2017-09-05

## 2017-09-05 ENCOUNTER — ALLIED HEALTH/NURSE VISIT (OUTPATIENT)
Dept: NURSING | Facility: CLINIC | Age: 52
End: 2017-09-05
Payer: COMMERCIAL

## 2017-09-05 DIAGNOSIS — Z71.84 TRAVEL ADVICE ENCOUNTER: Primary | ICD-10-CM

## 2017-09-05 DIAGNOSIS — E29.1 HYPOGONADISM MALE: Primary | ICD-10-CM

## 2017-09-05 DIAGNOSIS — R79.89 LOW TESTOSTERONE IN MALE: Primary | ICD-10-CM

## 2017-09-05 DIAGNOSIS — E29.1 HYPOGONADISM MALE: ICD-10-CM

## 2017-09-05 PROCEDURE — 99207 ZZC NO CHARGE NURSE ONLY: CPT

## 2017-09-05 PROCEDURE — 96372 THER/PROPH/DIAG INJ SC/IM: CPT

## 2017-09-05 RX ORDER — TESTOSTERONE CYPIONATE 200 MG/ML
150 INJECTION, SOLUTION INTRAMUSCULAR
Qty: 1 ML | Refills: 5 | Status: SHIPPED | OUTPATIENT
Start: 2017-09-05 | End: 2017-11-17

## 2017-09-05 RX ORDER — ACETAZOLAMIDE 250 MG/1
250 TABLET ORAL 2 TIMES DAILY
Qty: 8 TABLET | Refills: 0 | Status: SHIPPED | OUTPATIENT
Start: 2017-09-05 | End: 2018-04-03

## 2017-09-05 NOTE — TELEPHONE ENCOUNTER
Pt walked into clinic demanding a testosterone shot right now since [place] ( this was at 745 am today 9/5/2017) - pt was told that he needed to wait until the pharmacy was open, so he could  the script then the RN can administer it ( Goldie  told pt this)     After reviewing pts chart there are no orders for testosterone in chart     Please review and advise     Thank you     Tiffanie Madrid RN, BSN  [place] [facility]

## 2017-09-05 NOTE — TELEPHONE ENCOUNTER
Called # [phone] (home)     Advised pt to  the testosterone at pharmacy     Patient stated an understanding and agreed with plan.    Tiffanie Madrid RN, BSN  [place] [facility]

## 2017-09-05 NOTE — TELEPHONE ENCOUNTER
acetaZOLAMIDE (DIAMOX) 125 MG tablet (Discontinued) 10 tablet 0 7/22/2017 8/19/2017 --   Sig: Take 1 tablet (125 mg) by mouth 2 times daily Start day before ascending to higher altitude   Class: E-Prescribe       Last Office Visit with [facility], [facility] or [facility] prescribing provider: 8/19/2017  Future Office visit:    Next 5 appointments (look out 90 days)     Sep 05, 2017  1:00 PM CDT   Nurse Only with [facility]   [facility] ([facility]    [address] [place] [id number] [phone]                   [place] leaving [place] and needs this for travel     Routing refill request to provider for review/approval because:  Drug not active on patient's medication list    Please review and advise     Thank you     Tiffanie Madrid RN, BSN  [place] [facility]

## 2017-09-05 NOTE — TELEPHONE ENCOUNTER
Reason for Call:  Medication or medication refill:    Do you use a [facility]?  Name of the pharmacy and phone number for the current request:  [facility] in [place]    Name of the medication requested: Diamox. Says he needs it today as he's leaving [place].    Can we leave a detailed message on this number? YES    Phone number patient can be reached at: Cell number on file:    Telephone Information:   Mobile [phone]     Best Time: Anytime    Call taken on 9/5/2017 at 7:53 AM by Goldie Coker

## 2017-09-05 NOTE — LETTER
[address] [address]  [phone]        March 9, 2018    Richard Ann  [address] [address]              Dear Richard Ann    This is to remind you that your non-fasting lab work is due.    You may call our office at [phone] to schedule an appointment.    Please disregard this notice if you have already had your labs drawn or made an appointment.        Sincerely,        Jenise Acosta PA-C

## 2017-09-05 NOTE — TELEPHONE ENCOUNTER
Still have not received records, please refax [facility]'s on file.    Testosterone ordered at 150 mg every 14 days.  Have pt recheck testosterone labs in 3 months.      Jenise Acosta MS, PA-C

## 2017-09-20 ENCOUNTER — TELEPHONE (OUTPATIENT)
Dept: FAMILY MEDICINE | Facility: CLINIC | Age: 52
End: 2017-09-20

## 2017-09-20 NOTE — TELEPHONE ENCOUNTER
Let [facility] know, they will have this ready for the pt, advised pt they can contact the pharmacy for further refill request.    Also changed pt appt for tomorrow, was double booked and on MA schedule.    The patient indicates understanding of these issues and agrees with the plan.  Staci Maharaj RN  [place] [facility]

## 2017-09-20 NOTE — TELEPHONE ENCOUNTER
Pt wants to know if his Testerone needs to be ordered or if he will be able to pick it up at the pharmacy tomorrow before his appointment

## 2017-09-21 ENCOUNTER — ALLIED HEALTH/NURSE VISIT (OUTPATIENT)
Dept: NURSING | Facility: CLINIC | Age: 52
End: 2017-09-21
Payer: COMMERCIAL

## 2017-09-21 ENCOUNTER — TELEPHONE (OUTPATIENT)
Dept: FAMILY MEDICINE | Facility: CLINIC | Age: 52
End: 2017-09-21

## 2017-09-21 DIAGNOSIS — R79.89 LOW TESTOSTERONE IN MALE: Primary | ICD-10-CM

## 2017-09-21 DIAGNOSIS — E29.1 HYPOGONADISM MALE: ICD-10-CM

## 2017-09-21 PROCEDURE — 99207 ZZC NO CHARGE NURSE ONLY: CPT

## 2017-09-21 PROCEDURE — 96372 THER/PROPH/DIAG INJ SC/IM: CPT

## 2017-09-21 NOTE — TELEPHONE ENCOUNTER
Further review with patient chest pressure, if present to ER, o/w advise f/u visit today if possible

## 2017-09-21 NOTE — TELEPHONE ENCOUNTER
Patient scheduled with TS tomorrow.    Penny Townsend, JOSELYN, RN, N  [facility]) [phone]

## 2017-09-21 NOTE — TELEPHONE ENCOUNTER
Patient in clinic for injection and stated he was still having some SOB, cough after 08/19/2017 OV for Bronchitis    Acute Illness   Acute illness concerns: Cough, SOB  Onset: 07/18/2017    Fever: no    Chills/Sweats: no    Headache (location?): no    Sinus Pressure:no    Conjunctivitis:  no    Ear Pain: no    Rhinorrhea: no    Congestion: no    Sore Throat: no     Cough: YES-non-productive, worsening over time, with SOB    Wheeze: no    Decreased Appetite: no    Nausea: no    Vomiting: no    Diarrhea:  no    Dysuria/Freq.: no    Fatigue/Achiness: no    Sick/Strep Exposure: no    States he is having SOB intermittently, feels like pressure on the chest.      Therapies Tried and outcome: Z-Pack  Patient did not  the albuterol inhaler due to cost.     Symptoms did improve after patient completed therapy for Z-pack for 3weeks-1 month, then symptoms came back.   Symptoms have been present again for 1 week.     Patient uses [facility].     Routing to PCP for further review/recommendations/orders - would you like to try another antibiotic or have patient come in for OV? (please advise in MD CHARLES absence)    Marilyn Chacon RN  [place] [facility]

## 2017-09-22 ENCOUNTER — OFFICE VISIT (OUTPATIENT)
Dept: FAMILY MEDICINE | Facility: CLINIC | Age: 52
End: 2017-09-22
Payer: COMMERCIAL

## 2017-09-22 VITALS
TEMPERATURE: 98.2 F | HEIGHT: 67 IN | WEIGHT: 251 LBS | BODY MASS INDEX: 39.39 KG/M2 | OXYGEN SATURATION: 98 % | HEART RATE: 86 BPM | SYSTOLIC BLOOD PRESSURE: 116 MMHG | DIASTOLIC BLOOD PRESSURE: 80 MMHG

## 2017-09-22 DIAGNOSIS — Z51.81 MEDICATION MONITORING ENCOUNTER: ICD-10-CM

## 2017-09-22 DIAGNOSIS — R06.02 SOB (SHORTNESS OF BREATH): Primary | ICD-10-CM

## 2017-09-22 DIAGNOSIS — I10 HYPERTENSION GOAL BP (BLOOD PRESSURE) < 140/90: ICD-10-CM

## 2017-09-22 PROCEDURE — 99214 OFFICE O/P EST MOD 30 MIN: CPT | Performed by: FAMILY MEDICINE

## 2017-09-22 RX ORDER — ALBUTEROL SULFATE 90 UG/1
2 AEROSOL, METERED RESPIRATORY (INHALATION) EVERY 6 HOURS PRN
Qty: 1 INHALER | Refills: 1 | Status: SHIPPED | OUTPATIENT
Start: 2017-09-22 | End: 2017-09-22

## 2017-09-22 RX ORDER — PREDNISONE 20 MG/1
60 TABLET ORAL DAILY
Qty: 15 TABLET | Refills: 0 | Status: SHIPPED | OUTPATIENT
Start: 2017-09-22 | End: 2018-04-03

## 2017-09-22 RX ORDER — ALBUTEROL SULFATE 90 UG/1
2 AEROSOL, METERED RESPIRATORY (INHALATION) EVERY 6 HOURS PRN
Qty: 1 INHALER | Refills: 1 | Status: SHIPPED | OUTPATIENT
Start: 2017-09-22 | End: 2018-11-19

## 2017-09-22 RX ORDER — PREDNISONE 20 MG/1
60 TABLET ORAL DAILY
Qty: 15 TABLET | Refills: 0 | Status: SHIPPED | OUTPATIENT
Start: 2017-09-22 | End: 2017-09-22

## 2017-09-22 NOTE — PROGRESS NOTES
SUBJECTIVE:   Richard Ann is a 52 year old male who presents to clinic today for the following health issues:    Patient in clinic for injection and stated he was still having some SOB, cough after 08/19/2017 OV for Bronchitis. He gets short of breath when talking and has to stop to catch his breath. Has not noticed wheezing. Has not experienced fever, nausea, vomiting, headache, ear pain/congestion, sore throat, rhinorrhea, chest pain, abdominal pain, diarrhea, or fatigue. He tried his wife's albuterol inhaler and says it helps some.     Acute Illness   Acute illness concerns: Cough, SOB  Onset: 07/18/2017    Fever: no    Chills/Sweats: no    Headache (location?): no    Sinus Pressure:no    Conjunctivitis:  no    Ear Pain: no    Rhinorrhea: no    Congestion: no    Sore Throat: no   Cough: YES-non-productive, worsening over time, with SOB    Wheeze: no    Decreased Appetite: no    Nausea: no    Vomiting: no    Diarrhea:  no    Dysuria/Freq.: no    Fatigue/Achiness: no    Sick/Strep Exposure: no     States he is having SOB intermittently, feels like pressure on the chest.    Therapies Tried and outcome: Z-Pack  Patient did not  the albuterol inhaler due to cost.      Symptoms did improve after patient completed therapy for Z-pack for 3weeks-1 month, then symptoms came back.   Symptoms have been present again for 1 week.     HTN    BP Readings from Last 3 Encounters:   09/22/17 116/80   08/19/17 122/88   08/03/17 127/85      8/19    Acute Illness   Acute illness concerns: URI     Onset: 3 weeks       Fever: no    Chills/Sweats: no    Headache (location?): no    Sinus Pressure:no    Conjunctivitis:  no    Ear Pain: no    Rhinorrhea: no     Congestion: YES    Sore Throat: no    Cough: YES-productive of white sputum - green in the morning    Wheeze: YES, at times, SOB at times    Decreased Appetite: no    Nausea: no    Vomiting: no    Diarrhea:  no    Dysuria/Freq.: no    Fatigue/Achiness: no    Sick/Strep  "Exposure: no    No known allergy sx   Therapies Tried and outcome: nothing       Problem list and histories reviewed & adjusted, as indicated.  Additional history: as documented    Patient Active Problem List   Diagnosis     Family history of colon cancer in father     Hypogonadism male     Benign essential hypertension     Situational anxiety     Hypertension goal BP (blood pressure) < 140/90     Past Surgical History:   Procedure Laterality Date     NO HISTORY OF SURGERY         Social History   Substance Use Topics     Smoking status: Never Smoker     Smokeless tobacco: Never Used     Alcohol use Yes      Comment: 4 drinks per week     Family History   Problem Relation Age of Onset     Colon Cancer Father 72      one year after diagnosis             Reviewed and updated as needed this visit by clinical staff  Tobacco  Allergies  Meds  Med Hx  Surg Hx  Fam Hx  Soc Hx      Reviewed and updated as needed this visit by Provider         ROS:  Constitutional, HEENT, cardiovascular, pulmonary, GI, , musculoskeletal, neuro, skin, endocrine and psych systems are negative, except as otherwise noted.      OBJECTIVE:   /80  Pulse 86  Temp 98.2  F (36.8  C) (Oral)  Ht 5' 6.5\" (1.689 m)  Wt 251 lb (113.9 kg)  SpO2 98%  BMI 39.91 kg/m2  Body mass index is 39.91 kg/(m^2).  GENERAL: healthy, alert and no distress, morbidly obese   HENT: ear canals and TM's normal, nose and mouth without ulcers or lesions  NECK: no adenopathy, no asymmetry, masses, or scars and thyroid normal to palpation  RESP: lungs clear to auscultation - no rales, rhonchi or wheezes  CV: regular rate and rhythm, normal S1 S2, no S3 or S4, no murmur, click or rub, no peripheral edema and peripheral pulses strong  ABDOMEN: soft, nontender, no hepatosplenomegaly, no masses and bowel sounds normal  MS: no gross musculoskeletal defects noted, no edema    Diagnostic Test Results: No results found for this or any previous visit (from the " past 24 hour(s)).      ASSESSMENT/PLAN:         ICD-10-CM    1. SOB (shortness of breath) R06.02 predniSONE (DELTASONE) 20 MG tablet     albuterol (PROAIR HFA/PROVENTIL HFA/VENTOLIN HFA) 108 (90 BASE) MCG/ACT Inhaler     DISCONTINUED: predniSONE (DELTASONE) 20 MG tablet     DISCONTINUED: albuterol (PROAIR HFA/PROVENTIL HFA/VENTOLIN HFA) 108 (90 BASE) MCG/ACT Inhaler   2. Hypertension goal BP (blood pressure) < 140/90 I10    3. Medication monitoring encounter Z51.81      Will consider PFTs, x ray/CT, stress echo, if no improvement with prednisone burst and albuterol inhaler.    Javi Davis MS3 acted as a scribe for me today and accurately reflected my words and actions.    I agree with above History, Review of Systems, Physical exam and Plan.  I have reviewed the content of the documentation and have edited it as needed. I have personally performed the services documented here and the documentation accurately represents those services and the decisions I have made.             Hernandez Amador MD, FAAFP    [address]  [id number] [phone] Fax

## 2017-09-22 NOTE — MR AVS SNAPSHOT
"              After Visit Summary   9/22/2017    Richard Ann    MRN: [id number]           Patient Information     Date Of Birth          1965        Visit Information        Provider Department      9/22/2017 2:20 PM Hernandez Amador MD [facility]        Today's Diagnoses     SOB (shortness of breath)    -  1    Hypertension goal BP (blood pressure) < 140/90        Medication monitoring encounter           Follow-ups after your visit        Who to contact     If you have questions or need follow up information about today's clinic visit or your schedule please contact [facility] directly at [phone].  Normal or non-critical lab and imaging results will be communicated to you by MyChart, letter or phone within 4 business days after the clinic has received the results. If you do not hear from us within 7 days, please contact the clinic through [employer] or phone. If you have a critical or abnormal lab result, we will notify you by phone as soon as possible.  Submit refill requests through [employer] or call your pharmacy and they will forward the refill request to us. Please allow 3 business days for your refill to be completed.          Additional Information About Your Visit        MyChart Information     [employer] gives you secure access to your electronic health record. If you see a primary care provider, you can also send messages to your care team and make appointments. If you have questions, please call your primary care clinic.  If you do not have a primary care provider, please call [phone] and they will assist you.        Care EveryWhere ID     This is your Care EveryWhere ID. This could be used by other organizations to access your [place] medical records  [id number]        Your Vitals Were     Pulse Temperature Height Pulse Oximetry BMI (Body Mass Index)       86 98.2  F (36.8  C) (Oral) 5' 6.5\" (1.689 m) 98% 39.91 kg/m2        Blood Pressure from Last 3 " Encounters:   09/22/17 116/80   08/19/17 122/88   08/03/17 127/85    Weight from Last 3 Encounters:   09/22/17 251 lb (113.9 kg)   08/19/17 261 lb (118.4 kg)   07/05/17 265 lb (120.2 kg)              Today, you had the following     No orders found for display         Today's Medication Changes          These changes are accurate as of: 9/22/17 11:59 PM.  If you have any questions, ask your nurse or doctor.               Start taking these medicines.        Dose/Directions    predniSONE 20 MG tablet   Commonly known as:  DELTASONE   Used for:  SOB (shortness of breath)   Started by:  Hernandez Amador MD        Dose:  60 mg   Take 3 tablets (60 mg) by mouth daily   Quantity:  15 tablet   Refills:  0         These medicines have changed or have updated prescriptions.        Dose/Directions    * albuterol 108 (90 BASE) MCG/ACT Inhaler   Commonly known as:  PROAIR HFA/PROVENTIL HFA/VENTOLIN HFA   This may have changed:  Another medication with the same name was added. Make sure you understand how and when to take each.   Used for:  Acute bronchitis with symptoms > 10 days   Changed by:  Hernandez Amador MD        Dose:  2 puff   Inhale 2 puffs into the lungs every 6 hours as needed for shortness of breath / dyspnea or wheezing   Quantity:  1 Inhaler   Refills:  0       * albuterol 108 (90 BASE) MCG/ACT Inhaler   Commonly known as:  PROAIR HFA/PROVENTIL HFA/VENTOLIN HFA   This may have changed:  You were already taking a medication with the same name, and this prescription was added. Make sure you understand how and when to take each.   Used for:  SOB (shortness of breath)   Changed by:  Hernandez Amador MD        Dose:  2 puff   Inhale 2 puffs into the lungs every 6 hours as needed for shortness of breath / dyspnea or wheezing   Quantity:  1 Inhaler   Refills:  1       * Notice:  This list has 2 medication(s) that are the same as other medications prescribed for you. Read the directions carefully, and ask your doctor or other care  provider to review them with you.         Where to get your medicines      These medications were sent to [facility] - [place], [place] - [address]  [address], [facility] [address]     Phone:  [phone]     albuterol 108 (90 BASE) MCG/ACT Inhaler    predniSONE 20 MG tablet                Primary Care Provider Office Phone # Fax #    Jenise Acosta PA-C [phone]       [facility] [address] [place] [address]        Equal Access to Services     [facility]: Hadii aad ku hadasho Soomaali, waaxda luqadaha, qaybta kaalmada adeegyada, waxay ponchoin ai valenzuela . [facility] [phone].    ATENCIÓN: Si habla español, tiene a mratin disposición servicios gratuitos de asistencia lingüística. [facility] [phone].    We comply with applicable federal civil rights laws and [place] laws. We do not discriminate on the basis of race, color, national origin, age, disability sex, sexual orientation or gender identity.            Thank you!     Thank you for choosing [facility]  for your care. Our goal is always to provide you with excellent care. Hearing back from our patients is one way we can continue to improve our services. Please take a few minutes to complete the written survey that you may receive in the mail after your visit with us. Thank you!             Your Updated Medication List - Protect others around you: Learn how to safely use, store and throw away your medicines at www.disposemymeds.org.          This list is accurate as of: 9/22/17 11:59 PM.  Always use your most recent med list.                   Brand Name Dispense Instructions for use Diagnosis    acetaZOLAMIDE 250 MG tablet    DIAMOX    8 tablet    Take 1 tablet (250 mg) by mouth 2 times daily Start 1- 2 days prior to ascent and continue for 48 hours after reaching peak    Travel advice encounter       * albuterol 108 (90 BASE)  MCG/ACT Inhaler    PROAIR HFA/PROVENTIL HFA/VENTOLIN HFA    1 Inhaler    Inhale 2 puffs into the lungs every 6 hours as needed for shortness of breath / dyspnea or wheezing    Acute bronchitis with symptoms > 10 days       * albuterol 108 (90 BASE) MCG/ACT Inhaler    PROAIR HFA/PROVENTIL HFA/VENTOLIN HFA    1 Inhaler    Inhale 2 puffs into the lungs every 6 hours as needed for shortness of breath / dyspnea or wheezing    SOB (shortness of breath)       azithromycin 250 MG tablet    ZITHROMAX    6 tablet    2 tabs day 1 and the 1 tab daily for 4 more days    Acute bronchitis with symptoms > 10 days       losartan 100 MG tablet    COZAAR    90 tablet    Take 1 tablet (100 mg) by mouth daily    Benign essential hypertension       predniSONE 20 MG tablet    DELTASONE    15 tablet    Take 3 tablets (60 mg) by mouth daily    SOB (shortness of breath)       PRILOSEC PO      Take 20 mg by mouth        spironolactone 50 MG tablet    ALDACTONE    90 tablet    Take 1 tablet (50 mg) by mouth daily    Benign essential hypertension       testosterone cypionate 200 MG/ML injection    DEPOTESTOTERONE    1 mL    Inject 0.75 mLs (150 mg) into the muscle every 14 days To be administered by clinic RN    Hypogonadism male       * Notice:  This list has 2 medication(s) that are the same as other medications prescribed for you. Read the directions carefully, and ask your doctor or other care provider to review them with you.

## 2017-09-22 NOTE — NURSING NOTE
"Chief Complaint   Patient presents with     Cough       Initial /80  Pulse 86  Temp 98.2  F (36.8  C) (Oral)  Ht 5' 6.5\" (1.689 m)  Wt 251 lb (113.9 kg)  SpO2 98%  BMI 39.91 kg/m2 Estimated body mass index is 39.91 kg/(m^2) as calculated from the following:    Height as of this encounter: 5' 6.5\" (1.689 m).    Weight as of this encounter: 251 lb (113.9 kg)..  BP completed using cuff size: silverio Nickerson MA  "

## 2017-10-05 ENCOUNTER — ALLIED HEALTH/NURSE VISIT (OUTPATIENT)
Dept: NURSING | Facility: CLINIC | Age: 52
End: 2017-10-05
Payer: COMMERCIAL

## 2017-10-05 DIAGNOSIS — E29.1 HYPOGONADISM MALE: Primary | ICD-10-CM

## 2017-10-05 PROCEDURE — 99207 ZZC NO CHARGE NURSE ONLY: CPT

## 2017-10-05 PROCEDURE — 96372 THER/PROPH/DIAG INJ SC/IM: CPT

## 2017-10-20 ENCOUNTER — ALLIED HEALTH/NURSE VISIT (OUTPATIENT)
Dept: NURSING | Facility: CLINIC | Age: 52
End: 2017-10-20
Payer: COMMERCIAL

## 2017-10-20 DIAGNOSIS — E29.1 HYPOGONADISM MALE: Primary | ICD-10-CM

## 2017-10-20 PROCEDURE — 96372 THER/PROPH/DIAG INJ SC/IM: CPT

## 2017-10-20 PROCEDURE — 99207 ZZC NO CHARGE NURSE ONLY: CPT

## 2017-11-03 ENCOUNTER — ALLIED HEALTH/NURSE VISIT (OUTPATIENT)
Dept: NURSING | Facility: CLINIC | Age: 52
End: 2017-11-03
Payer: COMMERCIAL

## 2017-11-03 DIAGNOSIS — E29.1 HYPOGONADISM MALE: Primary | ICD-10-CM

## 2017-11-03 PROCEDURE — 96372 THER/PROPH/DIAG INJ SC/IM: CPT

## 2017-11-03 PROCEDURE — 99207 ZZC NO CHARGE NURSE ONLY: CPT

## 2017-11-17 ENCOUNTER — ALLIED HEALTH/NURSE VISIT (OUTPATIENT)
Dept: NURSING | Facility: CLINIC | Age: 52
End: 2017-11-17
Payer: COMMERCIAL

## 2017-11-17 ENCOUNTER — TELEPHONE (OUTPATIENT)
Dept: FAMILY MEDICINE | Facility: CLINIC | Age: 52
End: 2017-11-17

## 2017-11-17 DIAGNOSIS — E29.1 HYPOGONADISM MALE: ICD-10-CM

## 2017-11-17 DIAGNOSIS — E29.1 HYPOGONADISM MALE: Primary | ICD-10-CM

## 2017-11-17 PROCEDURE — 99207 ZZC NO CHARGE NURSE ONLY: CPT

## 2017-11-17 PROCEDURE — 96372 THER/PROPH/DIAG INJ SC/IM: CPT

## 2017-11-17 RX ORDER — TESTOSTERONE CYPIONATE 200 MG/ML
150 INJECTION, SOLUTION INTRAMUSCULAR
Qty: 1 ML | Refills: 5 | Status: SHIPPED | OUTPATIENT
Start: 2017-11-17 | End: 2018-04-17

## 2017-11-17 NOTE — TELEPHONE ENCOUNTER
Controlled Substance Refill Request for Testosterone    Problem List Complete:  Yes    Last Written Prescription Date:  9/5/2017  Last Fill Quantity: 1ml,   # refills: 5    Last Office Visit with [facility] primary care provider: 9/22/2017    Clinic visit frequency required: none noted     Future Office visit:     Controlled substance agreement on file: No.     Processing:  Staff will hand deliver Rx to on-site pharmacy     checked in past 6 months?  No, route to RN     Routing refill request to provider for review/approval because:  Drug not on the [facility] refill protocol   Staci Maharaj RN  [facility]

## 2017-12-06 ENCOUNTER — ALLIED HEALTH/NURSE VISIT (OUTPATIENT)
Dept: NURSING | Facility: CLINIC | Age: 52
End: 2017-12-06
Payer: COMMERCIAL

## 2017-12-06 DIAGNOSIS — E29.1 HYPOGONADISM MALE: Primary | ICD-10-CM

## 2017-12-06 PROCEDURE — 99207 ZZC NO CHARGE NURSE ONLY: CPT

## 2017-12-06 PROCEDURE — 96372 THER/PROPH/DIAG INJ SC/IM: CPT

## 2017-12-20 ENCOUNTER — ALLIED HEALTH/NURSE VISIT (OUTPATIENT)
Dept: NURSING | Facility: CLINIC | Age: 52
End: 2017-12-20
Payer: COMMERCIAL

## 2017-12-20 DIAGNOSIS — E29.1 HYPOGONADISM MALE: Primary | ICD-10-CM

## 2017-12-20 PROCEDURE — 99207 ZZC NO CHARGE NURSE ONLY: CPT

## 2017-12-20 PROCEDURE — 96372 THER/PROPH/DIAG INJ SC/IM: CPT

## 2017-12-28 DIAGNOSIS — I10 BENIGN ESSENTIAL HYPERTENSION: ICD-10-CM

## 2017-12-29 NOTE — TELEPHONE ENCOUNTER
Requested Prescriptions   Pending Prescriptions Disp Refills     losartan (COZAAR) 100 MG tablet [Pharmacy Med Name: LOSARTAN 100MG TABLETS]  Last Written Prescription Date:  7/5/2017  Last Fill Quantity: 90 tablet,  # refills: 1   Last Office Visit with [facility], [facility] or [facility] prescribing provider:  9/22/2017   Future Office Visit:      90 tablet 0     Sig: TAKE 1 TABLET BY MOUTH DAILY    Angiotensin-II Receptors Passed    12/28/2017  5:46 PM       Passed - Blood pressure under 140/90 in past 12 months.    BP Readings from Last 3 Encounters:   09/22/17 116/80   08/19/17 122/88   08/03/17 127/85          Passed - Recent or future visit with authorizing provider's specialty    Patient had office visit in the last year or has a visit in the next 30 days with authorizing provider.  See chart review.          Passed - Patient is age 18 or older       Passed - Normal serum creatinine on file in past 12 months    Recent Labs   Lab Test  07/05/17   0852   CR  1.00          Passed - Normal serum potassium on file in past 12 months    Recent Labs   Lab Test  07/05/17   0852   POTASSIUM  4.4                    spironolactone (ALDACTONE) 50 MG tablet [Pharmacy Med Name: SPIRONOLACTONE 50MG TABLETS]  Last Written Prescription Date:  7/5/2017  Last Fill Quantity: 90 tablet,  # refills: 1   Last Office Visit with [facility], [facility] or [facility] prescribing provider:  9/22/2017   Future Office Visit:      90 tablet 0     Sig: TAKE 1 TABLET BY MOUTH DAILY    Diuretics (Including Combos) Protocol Passed    12/28/2017  5:46 PM       Passed - Blood pressure under 140/90    BP Readings from Last 3 Encounters:   09/22/17 116/80   08/19/17 122/88   08/03/17 127/85          Passed - Recent or future visit with authorizing provider's specialty    Patient had office visit in the last year or has a visit in the next 30 days with authorizing provider.  See chart review.          Passed - Patient is age 18 or older       Passed - Normal serum creatinine  on file in past 12 months    Recent Labs   Lab Test  07/05/17   0852   CR  1.00           Passed - Normal serum potassium on file in past 12 months    Recent Labs   Lab Test  07/05/17   0852   POTASSIUM  4.4           Passed - Normal serum sodium on file in past 12 months    Recent Labs   Lab Test  07/05/17   0852   NA  141

## 2018-01-03 ENCOUNTER — ALLIED HEALTH/NURSE VISIT (OUTPATIENT)
Dept: NURSING | Facility: CLINIC | Age: 53
End: 2018-01-03
Payer: COMMERCIAL

## 2018-01-03 DIAGNOSIS — E29.1 HYPOGONADISM MALE: Primary | ICD-10-CM

## 2018-01-03 PROCEDURE — 96372 THER/PROPH/DIAG INJ SC/IM: CPT

## 2018-01-03 PROCEDURE — 99207 ZZC NO CHARGE NURSE ONLY: CPT

## 2018-01-03 RX ORDER — SPIRONOLACTONE 50 MG/1
TABLET, FILM COATED ORAL
Qty: 90 TABLET | Refills: 0 | Status: SHIPPED | OUTPATIENT
Start: 2018-01-03 | End: 2018-04-03

## 2018-01-03 RX ORDER — LOSARTAN POTASSIUM 100 MG/1
TABLET ORAL
Qty: 90 TABLET | Refills: 0 | Status: SHIPPED | OUTPATIENT
Start: 2018-01-03 | End: 2018-04-03

## 2018-01-03 NOTE — TELEPHONE ENCOUNTER
Prescription approved per [facility] Refill Protocol.  Staci Maharaj RN  [facility]

## 2018-02-16 ENCOUNTER — ALLIED HEALTH/NURSE VISIT (OUTPATIENT)
Dept: NURSING | Facility: CLINIC | Age: 53
End: 2018-02-16
Payer: COMMERCIAL

## 2018-02-16 DIAGNOSIS — E29.1 HYPOGONADISM MALE: Primary | ICD-10-CM

## 2018-02-16 PROCEDURE — 99207 ZZC NO CHARGE NURSE ONLY: CPT

## 2018-02-16 PROCEDURE — 96372 THER/PROPH/DIAG INJ SC/IM: CPT

## 2018-03-14 ENCOUNTER — ALLIED HEALTH/NURSE VISIT (OUTPATIENT)
Dept: NURSING | Facility: CLINIC | Age: 53
End: 2018-03-14
Payer: COMMERCIAL

## 2018-03-14 DIAGNOSIS — E29.1 HYPOGONADISM MALE: Primary | ICD-10-CM

## 2018-03-14 PROCEDURE — 96372 THER/PROPH/DIAG INJ SC/IM: CPT

## 2018-03-14 PROCEDURE — 99207 ZZC NO CHARGE NURSE ONLY: CPT

## 2018-04-03 ENCOUNTER — OFFICE VISIT (OUTPATIENT)
Dept: FAMILY MEDICINE | Facility: CLINIC | Age: 53
End: 2018-04-03
Payer: COMMERCIAL

## 2018-04-03 ENCOUNTER — RADIANT APPOINTMENT (OUTPATIENT)
Dept: GENERAL RADIOLOGY | Facility: CLINIC | Age: 53
End: 2018-04-03
Attending: PHYSICIAN ASSISTANT
Payer: COMMERCIAL

## 2018-04-03 VITALS
HEART RATE: 102 BPM | WEIGHT: 268 LBS | TEMPERATURE: 98 F | HEIGHT: 67 IN | DIASTOLIC BLOOD PRESSURE: 92 MMHG | SYSTOLIC BLOOD PRESSURE: 146 MMHG | BODY MASS INDEX: 42.06 KG/M2 | OXYGEN SATURATION: 96 %

## 2018-04-03 DIAGNOSIS — R05.9 COUGH: ICD-10-CM

## 2018-04-03 DIAGNOSIS — I10 BENIGN ESSENTIAL HYPERTENSION: ICD-10-CM

## 2018-04-03 DIAGNOSIS — I10 HYPERTENSION GOAL BP (BLOOD PRESSURE) < 140/90: ICD-10-CM

## 2018-04-03 DIAGNOSIS — J18.9 PNEUMONIA DUE TO INFECTIOUS ORGANISM, UNSPECIFIED LATERALITY, UNSPECIFIED PART OF LUNG: Primary | ICD-10-CM

## 2018-04-03 DIAGNOSIS — E29.1 HYPOGONADISM MALE: ICD-10-CM

## 2018-04-03 LAB
DEPRECATED S PYO AG THROAT QL EIA: NORMAL
FLUAV+FLUBV AG SPEC QL: NEGATIVE
FLUAV+FLUBV AG SPEC QL: NEGATIVE
SPECIMEN SOURCE: NORMAL
SPECIMEN SOURCE: NORMAL

## 2018-04-03 PROCEDURE — 87081 CULTURE SCREEN ONLY: CPT | Performed by: PHYSICIAN ASSISTANT

## 2018-04-03 PROCEDURE — 71046 X-RAY EXAM CHEST 2 VIEWS: CPT | Mod: FY

## 2018-04-03 PROCEDURE — 87880 STREP A ASSAY W/OPTIC: CPT | Performed by: PHYSICIAN ASSISTANT

## 2018-04-03 PROCEDURE — 87804 INFLUENZA ASSAY W/OPTIC: CPT | Performed by: PHYSICIAN ASSISTANT

## 2018-04-03 PROCEDURE — 99214 OFFICE O/P EST MOD 30 MIN: CPT | Performed by: PHYSICIAN ASSISTANT

## 2018-04-03 PROCEDURE — 96372 THER/PROPH/DIAG INJ SC/IM: CPT | Performed by: PHYSICIAN ASSISTANT

## 2018-04-03 RX ORDER — LOSARTAN POTASSIUM 100 MG/1
100 TABLET ORAL DAILY
Qty: 90 TABLET | Refills: 1 | Status: SHIPPED | OUTPATIENT
Start: 2018-04-03 | End: 2018-10-01

## 2018-04-03 RX ORDER — SPIRONOLACTONE 50 MG/1
50 TABLET, FILM COATED ORAL DAILY
Qty: 90 TABLET | Refills: 1 | Status: SHIPPED | OUTPATIENT
Start: 2018-04-03 | End: 2018-10-01

## 2018-04-03 RX ORDER — LEVOFLOXACIN 500 MG/1
500 TABLET, FILM COATED ORAL DAILY
Qty: 7 TABLET | Refills: 0 | Status: SHIPPED | OUTPATIENT
Start: 2018-04-03 | End: 2018-04-17

## 2018-04-03 RX ORDER — BENZONATATE 100 MG/1
100 CAPSULE ORAL 3 TIMES DAILY PRN
Qty: 16 CAPSULE | Refills: 0 | Status: SHIPPED | OUTPATIENT
Start: 2018-04-03 | End: 2018-04-17

## 2018-04-03 RX ORDER — ALBUTEROL SULFATE 90 UG/1
2 AEROSOL, METERED RESPIRATORY (INHALATION) EVERY 6 HOURS PRN
Qty: 1 INHALER | Refills: 0 | Status: SHIPPED | OUTPATIENT
Start: 2018-04-03 | End: 2018-11-19

## 2018-04-03 ASSESSMENT — ENCOUNTER SYMPTOMS
FOCAL WEAKNESS: 0
CHILLS: 0
DIARRHEA: 0
SHORTNESS OF BREATH: 1
SPUTUM PRODUCTION: 1
MYALGIAS: 0
SORE THROAT: 1
VOMITING: 0
COUGH: 1
HEADACHES: 1
NAUSEA: 0
SINUS PAIN: 1
FEVER: 1
ABDOMINAL PAIN: 0

## 2018-04-03 NOTE — PROGRESS NOTES
HPI    SUBJECTIVE:   Richard Ann is a 52 year old male who presents to clinic today for the following health issues:    Acute Illness   Acute illness concerns: URI  Onset: 7-8 days    Fever: YES-  Unknown Tmax    Chills/Sweats: YES    Headache (location?): YES    Sinus Pressure:YES    Conjunctivitis:  YES: bilateral    Ear Pain: YES: bilateral    Rhinorrhea: YES    Congestion: YES    Sore Throat: YES     Cough: YES-productive of green sputum    Wheeze: YES    Decreased Appetite: YES    Nausea: no    Vomiting: no    Diarrhea:  no    Dysuria/Freq.: no    Fatigue/Achiness: YES    Sick/Strep Exposure: YES- just got back from [place] today - was sick the entire trip     Therapies Tried and outcome: nyquil, dayquil and OTC allergy meds    Pt reports chest tightness and SOB. No chest pain.  Took Dayquil this AM. Took BP meds as well- needs refills, these were his last doses.  No hx asthma or tobacco use.    Additional complaints:   Needs testosterone injection purchased at outside facility administered by nurse           Chart Review:  No flowsheet data found.  DANNIE-7 SCORE 2017   Total Score 2       Patient Active Problem List   Diagnosis     Family history of colon cancer in father     Hypogonadism male     Benign essential hypertension     Situational anxiety     Hypertension goal BP (blood pressure) < 140/90     Past Surgical History:   Procedure Laterality Date     NO HISTORY OF SURGERY       Family History   Problem Relation Age of Onset     Colon Cancer Father 72      one year after diagnosis      Social History   Substance Use Topics     Smoking status: Never Smoker     Smokeless tobacco: Never Used     Alcohol use Yes      Comment: 4 drinks per week        Problem list, Medication list, Allergies, Medical/Social/Surg hx reviewed in [employer], updated as appropriate.        Review of Systems   Constitutional: Positive for fever and malaise/fatigue. Negative for chills.   HENT: Positive for congestion,  "sinus pain and sore throat.    Respiratory: Positive for cough, sputum production and shortness of breath.    Cardiovascular: Negative for chest pain.   Gastrointestinal: Negative for abdominal pain, diarrhea, nausea and vomiting.   Musculoskeletal: Negative for myalgias.   Skin: Negative for rash.   Neurological: Positive for headaches. Negative for focal weakness.   All other systems reviewed and are negative.        Physical Exam   Constitutional: He is oriented to person, place, and time and well-developed, well-nourished, and in no distress.   HENT:   Head: Normocephalic and atraumatic.   Right Ear: Tympanic membrane, external ear and ear canal normal.   Left Ear: Tympanic membrane, external ear and ear canal normal.   Nose: Nose normal.   Mouth/Throat: Uvula is midline, oropharynx is clear and moist and mucous membranes are normal.   Cardiovascular: Normal rate, regular rhythm and normal heart sounds.    Pulmonary/Chest: Effort normal and breath sounds normal.   Musculoskeletal: Normal range of motion.   Neurological: He is alert and oriented to person, place, and time. Gait normal.   Skin: Skin is warm and dry.   Nursing note and vitals reviewed.      Vital Signs  BP (!) 146/92  Pulse 102  Temp 98  F (36.7  C) (Oral)  Ht 5' 6.5\" (1.689 m)  Wt 268 lb (121.6 kg)  SpO2 96%  BMI 42.61 kg/m2   Body mass index is 42.61 kg/(m^2).    Diagnostic Test Results:  Results for orders placed or performed in visit on 04/03/18 (from the past 24 hour(s))   Influenza A/B antigen   Result Value Ref Range    Influenza A/B Agn Specimen Nasopharyngeal     Influenza A Negative NEG^Negative    Influenza B Negative NEG^Negative   Strep, Rapid Screen   Result Value Ref Range    Specimen Description Throat     Rapid Strep A Screen       NEGATIVE: No Group A streptococcal antigen detected by immunoassay, await culture report.     CXR - haziness bilaterally, possible infiltrate JOANN per my read    ASSESSMENT/PLAN:                     "                                    ICD-10-CM    1. Pneumonia  J18.9 levofloxacin (LEVAQUIN) 500 MG tablet     albuterol (PROAIR HFA/PROVENTIL HFA/VENTOLIN HFA) 108 (90 BASE) MCG/ACT Inhaler     benzonatate (TESSALON) 100 MG capsule   2. Cough R05 Influenza A/B antigen     Strep, Rapid Screen     XR Chest 2 Views     Beta strep group A culture   3. Hypogonadism male E29.1 INJECTION INTRAMUSCULAR OR SUB-Q   4. Hypertension goal BP (blood pressure) < 140/90 I10    5. Benign essential hypertension I10 spironolactone (ALDACTONE) 50 MG tablet     losartan (COZAAR) 100 MG tablet     Strep/flu negative. Lungs CTAB, no resp distress- will treat for pneumonia. Rx Levaquin, Tessalon perles, and albuterol inhaler.     Nurse administered testosterone injection.    BP elevated today- advised no Dayquil, Nyquil, or Sudafed. Mucinex is safe. Meds refilled.    I have discussed any lab or imaging results, the patient's diagnosis, and my plan of treatment with the patient and/or family. Patient is aware to come back in if with worsening symptoms or if no relief despite treatment plan.  Patient voiced understanding and had no further questions.       Follow Up: Data Unavailable    JO ANN Benitez, PA-C  [facility] [place]

## 2018-04-03 NOTE — PROGRESS NOTES
"  SUBJECTIVE:   Richard Ann is a 52 year old male who presents to clinic today for the following health issues:    Acute Illness   Acute illness concerns: URI  Onset: 7-8 days    Fever: YES-     Chills/Sweats: YES    Headache (location?): YES    Sinus Pressure:YES    Conjunctivitis:  YES: bilateral    Ear Pain: YES: bilateral    Rhinorrhea: YES    Congestion: YES    Sore Throat: YES     Cough: YES-productive of green sputum    Wheeze: YES    Decreased Appetite: YES    Nausea: no    Vomiting: no    Diarrhea:  no    Dysuria/Freq.: no    Fatigue/Achiness: YES    Sick/Strep Exposure: YES- just got back from [place] today - was sick the entire trip     Therapies Tried and outcome: nyquil, dayquil and OTC allergy meds and current inhaler      {additional problems for provider to add:407619}    Problem list and histories reviewed & adjusted, as indicated.  Additional history: {NONE - AS DOCUMENTED:798408::\"as documented\"}    {HIST REVIEW/ LINKS 2:863926}    Reviewed and updated as needed this visit by clinical staff       Reviewed and updated as needed this visit by Provider         {PROVIDER CHARTING PREFERENCE:984680}  "

## 2018-04-03 NOTE — MR AVS SNAPSHOT
"              After Visit Summary   4/3/2018    Richard Ann    MRN: [id number]           Patient Information     Date Of Birth          1965        Visit Information        Provider Department      4/3/2018 11:40 AM Kylee Arenas PA-C [facility]        Today's Diagnoses     Pneumonia     -  1    Cough        Hypogonadism male        Hypertension goal BP (blood pressure) < 140/90        Benign essential hypertension           Follow-ups after your visit        Who to contact     If you have questions or need follow up information about today's clinic visit or your schedule please contact [facility] directly at [phone].  Normal or non-critical lab and imaging results will be communicated to you by blueKiwi Softwarehart, letter or phone within 4 business days after the clinic has received the results. If you do not hear from us within 7 days, please contact the clinic through blueKiwi Softwarehart or phone. If you have a critical or abnormal lab result, we will notify you by phone as soon as possible.  Submit refill requests through TigerTrade or call your pharmacy and they will forward the refill request to us. Please allow 3 business days for your refill to be completed.          Additional Information About Your Visit        MyChart Information     TigerTr[employer] gives you secure access to your electronic health record. If you see a primary care provider, you can also send messages to your care team and make appointments. If you have questions, please call your primary care clinic.  If you do not have a primary care provider, please call [phone] and they will assist you.        Care EveryWhere ID     This is your Care EveryWhere ID. This could be used by other organizations to access your [place] medical records  [id number]        Your Vitals Were     Pulse Temperature Height Pulse Oximetry BMI (Body Mass Index)       102 98  F (36.7  C) (Oral) 5' 6.5\" (1.689 m) 96% 42.61 kg/m2        " Blood Pressure from Last 3 Encounters:   04/03/18 (!) 146/92   09/22/17 116/80   08/19/17 122/88    Weight from Last 3 Encounters:   04/03/18 268 lb (121.6 kg)   09/22/17 251 lb (113.9 kg)   08/19/17 261 lb (118.4 kg)              We Performed the Following     Beta strep group A culture     Influenza A/B antigen     INJECTION INTRAMUSCULAR OR SUB-Q     Strep, Rapid Screen     XR Chest 2 Views          Today's Medication Changes          These changes are accurate as of 4/3/18 12:27 PM.  If you have any questions, ask your nurse or doctor.               Start taking these medicines.        Dose/Directions    benzonatate 100 MG capsule   Commonly known as:  TESSALON   Used for:  Pneumonia due to infectious organism, unspecified laterality, unspecified part of lung   Started by:  Kylee Arenas PA-C        Dose:  100 mg   Take 1 capsule (100 mg) by mouth 3 times daily as needed for cough   Quantity:  16 capsule   Refills:  0       levofloxacin 500 MG tablet   Commonly known as:  LEVAQUIN   Used for:  Pneumonia due to infectious organism, unspecified laterality, unspecified part of lung   Started by:  Kylee Arenas PA-C        Dose:  500 mg   Take 1 tablet (500 mg) by mouth daily   Quantity:  7 tablet   Refills:  0         These medicines have changed or have updated prescriptions.        Dose/Directions    * albuterol 108 (90 BASE) MCG/ACT Inhaler   Commonly known as:  PROAIR HFA/PROVENTIL HFA/VENTOLIN HFA   This may have changed:  Another medication with the same name was added. Make sure you understand how and when to take each.   Used for:  SOB (shortness of breath)   Changed by:  Kylee Arenas PA-C        Dose:  2 puff   Inhale 2 puffs into the lungs every 6 hours as needed for shortness of breath / dyspnea or wheezing   Quantity:  1 Inhaler   Refills:  1       * albuterol 108 (90 BASE) MCG/ACT Inhaler   Commonly known as:  PROAIR HFA/PROVENTIL HFA/VENTOLIN HFA   This may have  changed:  You were already taking a medication with the same name, and this prescription was added. Make sure you understand how and when to take each.   Used for:  Pneumonia due to infectious organism, unspecified laterality, unspecified part of lung   Changed by:  Kylee Arenas PA-C        Dose:  2 puff   Inhale 2 puffs into the lungs every 6 hours as needed for shortness of breath / dyspnea or wheezing   Quantity:  1 Inhaler   Refills:  0       losartan 100 MG tablet   Commonly known as:  COZAAR   This may have changed:  See the new instructions.   Used for:  Benign essential hypertension   Changed by:  Kylee Arenas PA-C        Dose:  100 mg   Take 1 tablet (100 mg) by mouth daily   Quantity:  90 tablet   Refills:  1       spironolactone 50 MG tablet   Commonly known as:  ALDACTONE   This may have changed:  See the new instructions.   Used for:  Benign essential hypertension   Changed by:  Kylee Arenas PA-C        Dose:  50 mg   Take 1 tablet (50 mg) by mouth daily   Quantity:  90 tablet   Refills:  1       * Notice:  This list has 2 medication(s) that are the same as other medications prescribed for you. Read the directions carefully, and ask your doctor or other care provider to review them with you.         Where to get your medicines      These medications were sent to [facility] [place] [id number]     Phone:  [phone]     albuterol 108 (90 BASE) MCG/ACT Inhaler    benzonatate 100 MG capsule    levofloxacin 500 MG tablet    losartan 100 MG tablet    spironolactone 50 MG tablet                Primary Care Provider Office Phone # Fax #    Jneise Acosta PA-C [phone]       [address] [address]        Equal Access to Services     NAHEED NELSON AH: le Sifuentes qaybta kaalmada adeegyada,  darwin overton ai vivassh la'aan ah. So [facility] [phone].    ATENCIÓN: Si habla navdeep, tiene a martin disposición servicios gratuitos de asistencia lingüística. Gayle staples [phone].    We comply with applicable federal civil rights laws and [place] laws. We do not discriminate on the basis of race, color, national origin, age, disability, sex, sexual orientation, or gender identity.            Thank you!     Thank you for choosing [facility]  for your care. Our goal is always to provide you with excellent care. Hearing back from our patients is one way we can continue to improve our services. Please take a few minutes to complete the written survey that you may receive in the mail after your visit with us. Thank you!             Your Updated Medication List - Protect others around you: Learn how to safely use, store and throw away your medicines at www.disposemymeds.org.          This list is accurate as of 4/3/18 12:27 PM.  Always use your most recent med list.                   Brand Name Dispense Instructions for use Diagnosis    * albuterol 108 (90 BASE) MCG/ACT Inhaler    PROAIR HFA/PROVENTIL HFA/VENTOLIN HFA    1 Inhaler    Inhale 2 puffs into the lungs every 6 hours as needed for shortness of breath / dyspnea or wheezing    SOB (shortness of breath)       * albuterol 108 (90 BASE) MCG/ACT Inhaler    PROAIR HFA/PROVENTIL HFA/VENTOLIN HFA    1 Inhaler    Inhale 2 puffs into the lungs every 6 hours as needed for shortness of breath / dyspnea or wheezing    Pneumonia due to infectious organism, unspecified laterality, unspecified part of lung       benzonatate 100 MG capsule    TESSALON    16 capsule    Take 1 capsule (100 mg) by mouth 3 times daily as needed for cough    Pneumonia due to infectious organism, unspecified laterality, unspecified part of lung       levofloxacin 500 MG tablet    LEVAQUIN    7 tablet    Take 1 tablet (500 mg) by mouth daily    Pneumonia due to infectious  organism, unspecified laterality, unspecified part of lung       losartan 100 MG tablet    COZAAR    90 tablet    Take 1 tablet (100 mg) by mouth daily    Benign essential hypertension       PRILOSEC PO      Take 20 mg by mouth        spironolactone 50 MG tablet    ALDACTONE    90 tablet    Take 1 tablet (50 mg) by mouth daily    Benign essential hypertension       testosterone cypionate 200 MG/ML injection    DEPOTESTOTERONE    1 mL    Inject 0.75 mLs (150 mg) into the muscle every 14 days To be administered by clinic RN    Hypogonadism male       * Notice:  This list has 2 medication(s) that are the same as other medications prescribed for you. Read the directions carefully, and ask your doctor or other care provider to review them with you.

## 2018-04-03 NOTE — NURSING NOTE
"Chief Complaint   Patient presents with     URI       Initial BP (!) 146/92  Pulse 102  Temp 98  F (36.7  C) (Oral)  Ht 5' 6.5\" (1.689 m)  Wt 268 lb (121.6 kg)  SpO2 96%  BMI 42.61 kg/m2 Estimated body mass index is 42.61 kg/(m^2) as calculated from the following:    Height as of this encounter: 5' 6.5\" (1.689 m).    Weight as of this encounter: 268 lb (121.6 kg)..  BP completed using cuff size: silverio Nickerson MA  "

## 2018-04-04 LAB
BACTERIA SPEC CULT: NORMAL
SPECIMEN SOURCE: NORMAL

## 2018-04-05 ENCOUNTER — OFFICE VISIT (OUTPATIENT)
Dept: FAMILY MEDICINE | Facility: CLINIC | Age: 53
End: 2018-04-05
Payer: COMMERCIAL

## 2018-04-05 ENCOUNTER — RADIANT APPOINTMENT (OUTPATIENT)
Dept: GENERAL RADIOLOGY | Facility: CLINIC | Age: 53
End: 2018-04-05
Attending: PHYSICIAN ASSISTANT
Payer: COMMERCIAL

## 2018-04-05 ENCOUNTER — TELEPHONE (OUTPATIENT)
Dept: FAMILY MEDICINE | Facility: CLINIC | Age: 53
End: 2018-04-05

## 2018-04-05 VITALS
WEIGHT: 268 LBS | DIASTOLIC BLOOD PRESSURE: 84 MMHG | HEIGHT: 67 IN | TEMPERATURE: 98.3 F | HEART RATE: 97 BPM | SYSTOLIC BLOOD PRESSURE: 128 MMHG | OXYGEN SATURATION: 99 % | BODY MASS INDEX: 42.06 KG/M2

## 2018-04-05 DIAGNOSIS — R04.2 HEMOPTYSIS: ICD-10-CM

## 2018-04-05 DIAGNOSIS — R05.9 COUGH: ICD-10-CM

## 2018-04-05 DIAGNOSIS — J18.9 PNEUMONIA OF RIGHT LOWER LOBE DUE TO INFECTIOUS ORGANISM: Primary | ICD-10-CM

## 2018-04-05 PROBLEM — E66.01 MORBID OBESITY (H): Status: ACTIVE | Noted: 2018-04-05

## 2018-04-05 LAB
BASOPHILS # BLD AUTO: 0 10E9/L (ref 0–0.2)
BASOPHILS NFR BLD AUTO: 0.4 %
DIFFERENTIAL METHOD BLD: ABNORMAL
EOSINOPHIL # BLD AUTO: 0.2 10E9/L (ref 0–0.7)
EOSINOPHIL NFR BLD AUTO: 2.5 %
ERYTHROCYTE [DISTWIDTH] IN BLOOD BY AUTOMATED COUNT: 12.9 % (ref 10–15)
ERYTHROCYTE [SEDIMENTATION RATE] IN BLOOD BY WESTERGREN METHOD: 27 MM/H (ref 0–20)
HCT VFR BLD AUTO: 38.4 % (ref 40–53)
HGB BLD-MCNC: 13.3 G/DL (ref 13.3–17.7)
LYMPHOCYTES # BLD AUTO: 2.1 10E9/L (ref 0.8–5.3)
LYMPHOCYTES NFR BLD AUTO: 30.8 %
MCH RBC QN AUTO: 28.1 PG (ref 26.5–33)
MCHC RBC AUTO-ENTMCNC: 34.6 G/DL (ref 31.5–36.5)
MCV RBC AUTO: 81 FL (ref 78–100)
MONOCYTES # BLD AUTO: 0.7 10E9/L (ref 0–1.3)
MONOCYTES NFR BLD AUTO: 10.2 %
NEUTROPHILS # BLD AUTO: 3.9 10E9/L (ref 1.6–8.3)
NEUTROPHILS NFR BLD AUTO: 56.1 %
PLATELET # BLD AUTO: 232 10E9/L (ref 150–450)
RBC # BLD AUTO: 4.73 10E12/L (ref 4.4–5.9)
WBC # BLD AUTO: 6.9 10E9/L (ref 4–11)

## 2018-04-05 PROCEDURE — 99214 OFFICE O/P EST MOD 30 MIN: CPT | Performed by: PHYSICIAN ASSISTANT

## 2018-04-05 PROCEDURE — 85652 RBC SED RATE AUTOMATED: CPT | Performed by: PHYSICIAN ASSISTANT

## 2018-04-05 PROCEDURE — 36415 COLL VENOUS BLD VENIPUNCTURE: CPT | Performed by: PHYSICIAN ASSISTANT

## 2018-04-05 PROCEDURE — 86140 C-REACTIVE PROTEIN: CPT | Performed by: PHYSICIAN ASSISTANT

## 2018-04-05 PROCEDURE — 71046 X-RAY EXAM CHEST 2 VIEWS: CPT | Mod: FY

## 2018-04-05 PROCEDURE — 85025 COMPLETE CBC W/AUTO DIFF WBC: CPT | Performed by: PHYSICIAN ASSISTANT

## 2018-04-05 RX ORDER — CODEINE PHOSPHATE AND GUAIFENESIN 10; 100 MG/5ML; MG/5ML
1 SOLUTION ORAL EVERY 4 HOURS PRN
Qty: 236 ML | Refills: 0 | Status: SHIPPED | OUTPATIENT
Start: 2018-04-05 | End: 2018-04-17

## 2018-04-05 RX ORDER — AZITHROMYCIN 250 MG/1
TABLET, FILM COATED ORAL
Qty: 6 TABLET | Refills: 0 | Status: SHIPPED | OUTPATIENT
Start: 2018-04-05 | End: 2018-04-17

## 2018-04-05 NOTE — TELEPHONE ENCOUNTER
Reason for Call:  Other call back    Detailed comments: pt still has symptoms and blood in phlegm    Phone Number Patient can be reached at: Cell number on file:    Telephone Information:   Mobile [phone]       Best Time: any    Can we leave a detailed message on this number? YES    Call taken on 4/5/2018 at 8:15 AM by Sandra Pendleton

## 2018-04-05 NOTE — NURSING NOTE
"Chief Complaint   Patient presents with     Cough     coughing up blood. pnemonia diagnoses on 4/3       Initial /84  Pulse 97  Temp 98.3  F (36.8  C) (Tympanic)  Ht 5' 6.5\" (1.689 m)  Wt 268 lb (121.6 kg)  SpO2 99%  BMI 42.61 kg/m2 Estimated body mass index is 42.61 kg/(m^2) as calculated from the following:    Height as of this encounter: 5' 6.5\" (1.689 m).    Weight as of this encounter: 268 lb (121.6 kg).  Medication Reconciliation: complete    "

## 2018-04-05 NOTE — PROGRESS NOTES
"  SUBJECTIVE:   Richard Ann is a 52 year old male who presents to clinic today for the following health issues:    Cough - The patient was seen in the clinic on 4/3/2018 for cough, fever, and congestion for one week. He had a negative strep/flu and negative chest x-ray. He was diagnosed with pneumonia and started on levofloxacin 500 mg for seven days, albuterol inhaler prn 6 hours, and Tessalon perles. The patient now reports having hemoptysis for the past two days. He reports having dark blood tinged sputum (around edges of large green mucous he coughs up). He has been having tactile fevers.  He also has been shortness of breath after coughing and headaches in the morning. He has been having difficulty sleeping due to the symptoms. He has been taking tylenol and ibuprofen for management of the symptoms. He feels that his symptoms initially improved after last being seen but has worsened over the past day. He denies asthma history.       Problem list and histories reviewed & adjusted, as indicated.  Additional history: as documented      Reviewed and updated as needed this visit by clinical staff  Tobacco  Allergies  Meds  Problems  Med Hx  Surg Hx  Fam Hx  Soc Hx        Reviewed and updated as needed this visit by Provider  Tobacco  Allergies  Meds  Problems  Med Hx  Surg Hx  Fam Hx  Soc Hx          ROS:  Constitutional, HEENT, cardiovascular, pulmonary, GI, , musculoskeletal, neuro, skin, endocrine and psych systems are negative, except as otherwise noted.    This document serves as a record of the services and decisions personally performed and made by Jenise Acosta PA-C. It was created on his behalf by Willow Mccormick, a trained medical scribe. The creation of this document is based on the provider's statements to the medical scribe.  Willow Mccormick 3:48 PM 4/5/2018  OBJECTIVE:   /84  Pulse 97  Temp 98.3  F (36.8  C) (Tympanic)  Ht 5' 6.5\" (1.689 m)  Wt 268 lb (121.6 kg)  SpO2 " 99%  BMI 42.61 kg/m2  Body mass index is 42.61 kg/(m^2).  GENERAL: healthy, alert and no distress  EYES: Eyes grossly normal to inspection, PERRL and conjunctivae and sclerae normal  HENT: ear canals and TM's normal, nose and mouth without ulcers or lesions  NECK: no adenopathy, no asymmetry, masses, or scars and thyroid normal to palpation  RESP: lungs clear to auscultation - no rales, rhonchi or wheezes  CV: regular rate and rhythm, normal S1 S2, no S3 or S4, no murmur, click or rub, no peripheral edema and peripheral pulses strong  MS: no gross musculoskeletal defects noted, no edema  SKIN: no suspicious lesions or rashes  NEURO: Normal strength and tone, mentation intact and speech normal  PSYCH: mentation appears normal, affect normal/bright         Diagnostic Test Results:  Results for orders placed or performed in visit on 04/05/18   ESR: Erythrocyte sedimentation rate   Result Value Ref Range    Sed Rate 27 (H) 0 - 20 mm/h       Recent Results (from the past 744 hour(s))   XR Chest 2 Views    Narrative    XR CHEST 2 VW 4/3/2018 1:42 PM    HISTORY: ; Cough      Impression    IMPRESSION: Negative.    DARNELL CANTU MD   XR Chest 2 Views    Narrative    CHEST TWO VIEWS  4/5/2018 4:00 PM     HISTORY: 52-year-old with hemoptysis and cough.       Impression    IMPRESSION: Since April 3, 2018, heart size is normal. Slight  elevation of the right hemidiaphragm. No pleural effusion,  pneumothorax, or abnormal area of consolidation.    AL HUERTA MD         ASSESSMENT/PLAN:   Richard was seen today for cough.    Diagnoses and all orders for this visit:    Pneumonia of right lower lobe due to infectious organism (H)   Continue with Levaquin dose in the morning and albuterol as needed. Start taking azithromycin regimen in the evening. Take Robitussin AC as needed for cough. Elevate the head of the bed when sleeping. Start using Humidifier at night. Apply Vicks to neck and chest.  -     XR Chest 2 Views; Future  -      azithromycin (ZITHROMAX) 250 MG tablet; Two tablets first day, then one tablet daily for four days  -     guaiFENesin-codeine (ROBITUSSIN AC) 100-10 MG/5ML SOLN solution; Take 5 mLs by mouth every 4 hours as needed for cough  -     CBC with platelets and differential  -     ESR: Erythrocyte sedimentation rate  -     CRP, inflammation    Hemoptysis - likely irritation of bronchioles from forceful cough.  Is improving per his report.  If worsening or not improving notify clinic immediately.  Pt voiced understanding.  -     XR Chest 2 Views; Future    Cough  -     XR Chest 2 Views; Future  -     azithromycin (ZITHROMAX) 250 MG tablet; Two tablets first day, then one tablet daily for four days  -     guaiFENesin-codeine (ROBITUSSIN AC) 100-10 MG/5ML SOLN solution; Take 5 mLs by mouth every 4 hours as needed for cough  -     CBC with platelets and differential  -     ESR: Erythrocyte sedimentation rate  -     CRP, inflammation      The information in this document, created by a scribe for me, accurately reflects the services I personally performed and the decisions made by me. I have reviewed and approved this document for accuracy.    Jenise Acosta PA-C  [facility]

## 2018-04-05 NOTE — TELEPHONE ENCOUNTER
Pt notes they are still having a very bad cough keeping them up at night, waking up choking. Taking all the medications that were prescribed. Coughing up green phlegm with some tinges of blood.      Pt is drinking warm fluids. Pt is upright in bed. Hard for the pt to sleep upright.     Pt would like to know LP recommendations.    Staci Maharaj RN  [facility]

## 2018-04-05 NOTE — MR AVS SNAPSHOT
"              After Visit Summary   4/5/2018    Richard Ann    MRN: [id number]           Patient Information     Date Of Birth          1965        Visit Information        Provider Department      4/5/2018 3:20 PM Jenise Acosta PA-C [facility]        Today's Diagnoses     Pneumonia of right lower lobe due to infectious organism (H)    -  1    Hemoptysis        Cough           Follow-ups after your visit        Who to contact     If you have questions or need follow up information about today's clinic visit or your schedule please contact [facility] directly at [phone].  Normal or non-critical lab and imaging results will be communicated to you by MyChart, letter or phone within 4 business days after the clinic has received the results. If you do not hear from us within 7 days, please contact the clinic through Shoplocalhart or phone. If you have a critical or abnormal lab result, we will notify you by phone as soon as possible.  Submit refill requests through Tablus or call your pharmacy and they will forward the refill request to us. Please allow 3 business days for your refill to be completed.          Additional Information About Your Visit        MyChart Information     [employer] gives you secure access to your electronic health record. If you see a primary care provider, you can also send messages to your care team and make appointments. If you have questions, please call your primary care clinic.  If you do not have a primary care provider, please call [phone] and they will assist you.        Care EveryWhere ID     This is your Care EveryWhere ID. This could be used by other organizations to access your [place] medical records  [id number]        Your Vitals Were     Pulse Temperature Height Pulse Oximetry BMI (Body Mass Index)       97 98.3  F (36.8  C) (Tympanic) 5' 6.5\" (1.689 m) 99% 42.61 kg/m2        Blood Pressure from Last 3 Encounters:   04/05/18 " 128/84   04/03/18 (!) 146/92   09/22/17 116/80    Weight from Last 3 Encounters:   04/05/18 268 lb (121.6 kg)   04/03/18 268 lb (121.6 kg)   09/22/17 251 lb (113.9 kg)              We Performed the Following     CBC with platelets and differential     CRP, inflammation     ESR: Erythrocyte sedimentation rate          Today's Medication Changes          These changes are accurate as of 4/5/18  4:20 PM.  If you have any questions, ask your nurse or doctor.               Start taking these medicines.        Dose/Directions    azithromycin 250 MG tablet   Commonly known as:  ZITHROMAX   Used for:  Cough, Pneumonia of right lower lobe due to infectious organism (H)   Started by:  Jenise Acosta PA-C        Two tablets first day, then one tablet daily for four days   Quantity:  6 tablet   Refills:  0       guaiFENesin-codeine 100-10 MG/5ML Soln solution   Commonly known as:  ROBITUSSIN AC   Used for:  Cough, Pneumonia of right lower lobe due to infectious organism (H)   Started by:  Jenise Acosta PA-C        Dose:  1 tsp.   Take 5 mLs by mouth every 4 hours as needed for cough   Quantity:  236 mL   Refills:  0            Where to get your medicines      These medications were sent to [facility] [place] [address]     Phone:  [phone]     azithromycin 250 MG tablet         Some of these will need a paper prescription and others can be bought over the counter.  Ask your nurse if you have questions.     Bring a paper prescription for each of these medications     guaiFENesin-codeine 100-10 MG/5ML Soln solution                Primary Care Provider Office Phone # Fax #    Jenise Acosta PA-C [phone]       [place] [address]        Equal Access to Services     NAHEED NELSON AH: Tonia Traore, le fletcher, raeann johnson,  darwin overton ai koch khtoysh la'aan ah. So [facility] [phone].    ATENCIÓN: Si habla navdeep, tiene a martin disposición servicios gratuitos de asistencia lingüística. Gayle staples [phone].    We comply with applicable federal civil rights laws and [place] laws. We do not discriminate on the basis of race, color, national origin, age, disability, sex, sexual orientation, or gender identity.            Thank you!     Thank you for choosing [facility]  for your care. Our goal is always to provide you with excellent care. Hearing back from our patients is one way we can continue to improve our services. Please take a few minutes to complete the written survey that you may receive in the mail after your visit with us. Thank you!             Your Updated Medication List - Protect others around you: Learn how to safely use, store and throw away your medicines at www.disposemymeds.org.          This list is accurate as of 4/5/18  4:20 PM.  Always use your most recent med list.                   Brand Name Dispense Instructions for use Diagnosis    * albuterol 108 (90 BASE) MCG/ACT Inhaler    PROAIR HFA/PROVENTIL HFA/VENTOLIN HFA    1 Inhaler    Inhale 2 puffs into the lungs every 6 hours as needed for shortness of breath / dyspnea or wheezing    SOB (shortness of breath)       * albuterol 108 (90 BASE) MCG/ACT Inhaler    PROAIR HFA/PROVENTIL HFA/VENTOLIN HFA    1 Inhaler    Inhale 2 puffs into the lungs every 6 hours as needed for shortness of breath / dyspnea or wheezing    Pneumonia due to infectious organism, unspecified laterality, unspecified part of lung       azithromycin 250 MG tablet    ZITHROMAX    6 tablet    Two tablets first day, then one tablet daily for four days    Cough, Pneumonia of right lower lobe due to infectious organism (H)       benzonatate 100 MG capsule    TESSALON    16 capsule    Take 1 capsule (100 mg) by mouth 3 times daily as needed for cough    Pneumonia due to infectious  organism, unspecified laterality, unspecified part of lung       guaiFENesin-codeine 100-10 MG/5ML Soln solution    ROBITUSSIN AC    236 mL    Take 5 mLs by mouth every 4 hours as needed for cough    Cough, Pneumonia of right lower lobe due to infectious organism (H)       levofloxacin 500 MG tablet    LEVAQUIN    7 tablet    Take 1 tablet (500 mg) by mouth daily    Pneumonia due to infectious organism, unspecified laterality, unspecified part of lung       losartan 100 MG tablet    COZAAR    90 tablet    Take 1 tablet (100 mg) by mouth daily    Benign essential hypertension       PRILOSEC PO      Take 20 mg by mouth        spironolactone 50 MG tablet    ALDACTONE    90 tablet    Take 1 tablet (50 mg) by mouth daily    Benign essential hypertension       testosterone cypionate 200 MG/ML injection    DEPOTESTOTERONE    1 mL    Inject 0.75 mLs (150 mg) into the muscle every 14 days To be administered by clinic RN    Hypogonadism male       * Notice:  This list has 2 medication(s) that are the same as other medications prescribed for you. Read the directions carefully, and ask your doctor or other care provider to review them with you.

## 2018-04-05 NOTE — TELEPHONE ENCOUNTER
Pt is on very strong antibiotic.  If coughing up blood needs OV and evaluation.      Jenise Acosta, MS, PA-C

## 2018-04-06 LAB — CRP SERPL-MCNC: 9.4 MG/L (ref 0–8)

## 2018-04-09 NOTE — PROGRESS NOTES
Triage:   Please call and check in with pt re: his pneumonia     Richard  Here are your recent results. Your labs are indicative of an inflammatory process (not atypical in an infection).  How are you feeling with the antibiotic regimen?   If you have any questions please do not hesitate to contact our office via phone ([phone]) or [employer]art.    Jenise Acosta, MS, PA-C  [facility]

## 2018-04-17 ENCOUNTER — OFFICE VISIT (OUTPATIENT)
Dept: FAMILY MEDICINE | Facility: CLINIC | Age: 53
End: 2018-04-17
Payer: COMMERCIAL

## 2018-04-17 VITALS
HEART RATE: 114 BPM | TEMPERATURE: 97.6 F | BODY MASS INDEX: 42.38 KG/M2 | SYSTOLIC BLOOD PRESSURE: 132 MMHG | WEIGHT: 270 LBS | DIASTOLIC BLOOD PRESSURE: 82 MMHG | HEIGHT: 67 IN | OXYGEN SATURATION: 99 %

## 2018-04-17 DIAGNOSIS — G47.33 OSA (OBSTRUCTIVE SLEEP APNEA): ICD-10-CM

## 2018-04-17 DIAGNOSIS — E29.1 HYPOGONADISM MALE: ICD-10-CM

## 2018-04-17 DIAGNOSIS — H02.826 EYELID CYST, LEFT: ICD-10-CM

## 2018-04-17 DIAGNOSIS — R06.02 SOB (SHORTNESS OF BREATH): Primary | ICD-10-CM

## 2018-04-17 LAB
BASOPHILS # BLD AUTO: 0 10E9/L (ref 0–0.2)
BASOPHILS NFR BLD AUTO: 0.4 %
D DIMER PPP FEU-MCNC: 0.2 UG/ML FEU (ref 0–0.5)
DIFFERENTIAL METHOD BLD: NORMAL
EOSINOPHIL # BLD AUTO: 0.2 10E9/L (ref 0–0.7)
EOSINOPHIL NFR BLD AUTO: 2.9 %
ERYTHROCYTE [DISTWIDTH] IN BLOOD BY AUTOMATED COUNT: 14 % (ref 10–15)
ERYTHROCYTE [SEDIMENTATION RATE] IN BLOOD BY WESTERGREN METHOD: 8 MM/H (ref 0–20)
HCT VFR BLD AUTO: 41.7 % (ref 40–53)
HGB BLD-MCNC: 14 G/DL (ref 13.3–17.7)
LYMPHOCYTES # BLD AUTO: 2.3 10E9/L (ref 0.8–5.3)
LYMPHOCYTES NFR BLD AUTO: 41.3 %
MCH RBC QN AUTO: 27.9 PG (ref 26.5–33)
MCHC RBC AUTO-ENTMCNC: 33.6 G/DL (ref 31.5–36.5)
MCV RBC AUTO: 83 FL (ref 78–100)
MONOCYTES # BLD AUTO: 0.6 10E9/L (ref 0–1.3)
MONOCYTES NFR BLD AUTO: 11.3 %
NEUTROPHILS # BLD AUTO: 2.5 10E9/L (ref 1.6–8.3)
NEUTROPHILS NFR BLD AUTO: 44.1 %
PLATELET # BLD AUTO: 200 10E9/L (ref 150–450)
RBC # BLD AUTO: 5.01 10E12/L (ref 4.4–5.9)
WBC # BLD AUTO: 5.6 10E9/L (ref 4–11)

## 2018-04-17 PROCEDURE — 83880 ASSAY OF NATRIURETIC PEPTIDE: CPT | Performed by: PHYSICIAN ASSISTANT

## 2018-04-17 PROCEDURE — 36415 COLL VENOUS BLD VENIPUNCTURE: CPT | Performed by: PHYSICIAN ASSISTANT

## 2018-04-17 PROCEDURE — 99214 OFFICE O/P EST MOD 30 MIN: CPT | Performed by: PHYSICIAN ASSISTANT

## 2018-04-17 PROCEDURE — 86140 C-REACTIVE PROTEIN: CPT | Performed by: PHYSICIAN ASSISTANT

## 2018-04-17 PROCEDURE — 85025 COMPLETE CBC W/AUTO DIFF WBC: CPT | Performed by: PHYSICIAN ASSISTANT

## 2018-04-17 PROCEDURE — 85652 RBC SED RATE AUTOMATED: CPT | Performed by: PHYSICIAN ASSISTANT

## 2018-04-17 PROCEDURE — 85379 FIBRIN DEGRADATION QUANT: CPT | Performed by: PHYSICIAN ASSISTANT

## 2018-04-17 RX ORDER — BENZOCAINE/MENTHOL 6 MG-10 MG
LOZENGE MUCOUS MEMBRANE
Qty: 30 G | Refills: 0 | COMMUNITY
Start: 2018-04-17 | End: 2018-11-19

## 2018-04-17 RX ORDER — TESTOSTERONE CYPIONATE 200 MG/ML
150 INJECTION, SOLUTION INTRAMUSCULAR
Qty: 1 ML | Refills: 5 | Status: SHIPPED | OUTPATIENT
Start: 2018-04-17 | End: 2018-10-31

## 2018-04-17 NOTE — NURSING NOTE
"Chief Complaint   Patient presents with     Cough     Pnemonia f/u. Sty on left lower eyelid ~3 months. needs testosterone inj.        Initial /82  Pulse 114  Temp 97.6  F (36.4  C) (Tympanic)  Ht 5' 6.5\" (1.689 m)  Wt 270 lb (122.5 kg)  SpO2 99%  BMI 42.93 kg/m2 Estimated body mass index is 42.93 kg/(m^2) as calculated from the following:    Height as of this encounter: 5' 6.5\" (1.689 m).    Weight as of this encounter: 270 lb (122.5 kg).  Medication Reconciliation: complete    "

## 2018-04-17 NOTE — MR AVS SNAPSHOT
After Visit Summary   4/17/2018    Richard Ann    MRN: [id number]           Patient Information     Date Of Birth          1965        Visit Information        Provider Department      4/17/2018 3:20 PM Jenise Acosta PA-C [facility]        Today's Diagnoses     SOB (shortness of breath)    -  1    Hypogonadism male        SHANAE (obstructive sleep apnea)        Eyelid cyst, left           Follow-ups after your visit        Additional Services     SLEEP EVALUATION & MANAGEMENT REFERRAL - [facility]  [phone] (Age 18 and up)       Please be aware that coverage of these services is subject to the terms and limitations of your health insurance plan.  Call member services at your health plan with any benefit or coverage questions.      Please bring the following to your appointment:    >>   List of current medications   >>   This referral request   >>   Any documents/labs given to you for this referral                      Future tests that were ordered for you today     Open Future Orders        Priority Expected Expires Ordered    SLEEP EVALUATION & MANAGEMENT REFERRAL - [facility]  [phone] (Age 18 and up) Routine  4/17/2019 4/17/2018            Who to contact     If you have questions or need follow up information about today's clinic visit or your schedule please contact [facility] directly at [phone].  Normal or non-critical lab and imaging results will be communicated to you by MyChart, letter or phone within 4 business days after the clinic has received the results. If you do not hear from us within 7 days, please contact the clinic through MyChart or phone. If you have a critical or abnormal lab result, we will notify you by phone as soon as possible.  Submit refill requests through [employer] or call your pharmacy and they will forward the refill request to us. Please allow 3  "business days for your refill to be completed.          Additional Information About Your Visit        MyChart Information     [employer] gives you secure access to your electronic health record. If you see a primary care provider, you can also send messages to your care team and make appointments. If you have questions, please call your primary care clinic.  If you do not have a primary care provider, please call [phone] and they will assist you.        Care EveryWhere ID     This is your Care EveryWhere ID. This could be used by other organizations to access your [place] medical records  [id number]        Your Vitals Were     Pulse Temperature Height Pulse Oximetry BMI (Body Mass Index)       114 97.6  F (36.4  C) (Tympanic) 5' 6.5\" (1.689 m) 99% 42.93 kg/m2        Blood Pressure from Last 3 Encounters:   04/17/18 132/82   04/05/18 128/84   04/03/18 (!) 146/92    Weight from Last 3 Encounters:   04/17/18 270 lb (122.5 kg)   04/05/18 268 lb (121.6 kg)   04/03/18 268 lb (121.6 kg)              We Performed the Following     BNP-N terminal pro     CBC with platelets and differential     CRP, inflammation     D dimer, quantitative     EKG 12-lead complete w/read - Clinics     ESR: Erythrocyte sedimentation rate          Today's Medication Changes          These changes are accurate as of 4/17/18  3:56 PM.  If you have any questions, ask your nurse or doctor.               Start taking these medicines.        Dose/Directions    hydrocortisone 1 % cream   Commonly known as:  CORTAID   Used for:  Eyelid cyst, left   Started by:  Jenise Acosta PA-C        Apply sparingly to affected area once daily with Qtip   Quantity:  30 g   Refills:  0            Where to get your medicines      Some of these will need a paper prescription and others can be bought over the counter.  Ask your nurse if you have questions.     Bring a paper prescription for each of these medications     testosterone cypionate 200 MG/ML " injection       You don't need a prescription for these medications     hydrocortisone 1 % cream                Primary Care Provider Office Phone # Fax #    Jenise Acosta PA-C [phone]       [address] [address]        Equal Access to Services     NAHEED NELSON : Hadii aad ku hadasho Soomaali, waaxda luqadaha, qaybta kaalmada adeegyada, waxay idiin hayaan adeteresa palumbotoyleonarda lachristopher jones. So [facility] [phone].    ATENCIÓN: Si habla español, tiene a martin disposición servicios gratuitos de asistencia lingüística. [facility] [phone].    We comply with applicable federal civil rights laws and [place] laws. We do not discriminate on the basis of race, color, national origin, age, disability, sex, sexual orientation, or gender identity.            Thank you!     Thank you for choosing [facility]  for your care. Our goal is always to provide you with excellent care. Hearing back from our patients is one way we can continue to improve our services. Please take a few minutes to complete the written survey that you may receive in the mail after your visit with us. Thank you!             Your Updated Medication List - Protect others around you: Learn how to safely use, store and throw away your medicines at www.disposemymeds.org.          This list is accurate as of 4/17/18  3:56 PM.  Always use your most recent med list.                   Brand Name Dispense Instructions for use Diagnosis    * albuterol 108 (90 Base) MCG/ACT Inhaler    PROAIR HFA/PROVENTIL HFA/VENTOLIN HFA    1 Inhaler    Inhale 2 puffs into the lungs every 6 hours as needed for shortness of breath / dyspnea or wheezing    SOB (shortness of breath)       * albuterol 108 (90 Base) MCG/ACT Inhaler    PROAIR HFA/PROVENTIL HFA/VENTOLIN HFA    1 Inhaler    Inhale 2 puffs into the lungs every 6 hours as needed for shortness of breath / dyspnea or wheezing    Pneumonia due to infectious  organism, unspecified laterality, unspecified part of lung       hydrocortisone 1 % cream    CORTAID    30 g    Apply sparingly to affected area once daily with Qtip    Eyelid cyst, left       losartan 100 MG tablet    COZAAR    90 tablet    Take 1 tablet (100 mg) by mouth daily    Benign essential hypertension       PRILOSEC PO      Take 20 mg by mouth        spironolactone 50 MG tablet    ALDACTONE    90 tablet    Take 1 tablet (50 mg) by mouth daily    Benign essential hypertension       testosterone cypionate 200 MG/ML injection    DEPOTESTOTERONE    1 mL    Inject 0.75 mLs (150 mg) into the muscle every 14 days To be administered by clinic RN    Hypogonadism male       * Notice:  This list has 2 medication(s) that are the same as other medications prescribed for you. Read the directions carefully, and ask your doctor or other care provider to review them with you.

## 2018-04-17 NOTE — PROGRESS NOTES
SUBJECTIVE:   Richard Ann is a 52 year old male who presents to clinic today for the following health issues:    Pneumonia F/U  Richard presents to clinic for a follow up of his pneumonia that he was diagnosed with on 04/05/18. He was experiencing a significant cough and blood tinged sputum at the time. He was prescribed azithromycin and robitussin to treat his symptoms. Upon his visit today he reports that he is continuing to feel lethargic and is quick to fatigue. He states that he is still having a mild cough and some chest tightness with his breathing but it has improved greatly since he started the antibiotic. He denies any palpitations but states he is just out of breath. There has not been any recurrence of his bloody sputum. He has been using his albuterol inhaler approximately every 2-3 days as needed. Of note he did have an elevated CRP (9.4 mg/L) on 04/05/18.    Stye  In addition, Richard would like an examination of a stye that has been present on his left lower lid for approximately 3 months. He notes that the area might be growing in size. The area has not affected his vision but he reports it is bothersome.    Sleep Apnea  Richard has PMH significant for sleep apnea. He was originally prescribed a CPAP machine in [place] but has not followed up with sleep medicine in more than 5 years. He reports that the CPAP machine works very well but he would like a follow up for maintenance or reevaluation of his machine.    Problem list and histories reviewed & adjusted, as indicated.  Additional history: as documented    Patient Active Problem List   Diagnosis     Family history of colon cancer in father     Hypogonadism male     Benign essential hypertension     Situational anxiety     Hypertension goal BP (blood pressure) < 140/90     Morbid obesity (H)     SHANAE (obstructive sleep apnea)     Past Surgical History:   Procedure Laterality Date     NO HISTORY OF SURGERY         Social History    Substance Use Topics     Smoking status: Never Smoker     Smokeless tobacco: Never Used     Alcohol use Yes      Comment: 4 drinks per week     Family History   Problem Relation Age of Onset     Colon Cancer Father 72      one year after diagnosis         Current Outpatient Prescriptions   Medication Sig Dispense Refill     testosterone cypionate (DEPOTESTOTERONE) 200 MG/ML injection Inject 0.75 mLs (150 mg) into the muscle every 14 days To be administered by clinic RN 1 mL 5     hydrocortisone (CORTAID) 1 % cream Apply sparingly to affected area once daily with Qtip 30 g 0     albuterol (PROAIR HFA/PROVENTIL HFA/VENTOLIN HFA) 108 (90 BASE) MCG/ACT Inhaler Inhale 2 puffs into the lungs every 6 hours as needed for shortness of breath / dyspnea or wheezing 1 Inhaler 0     spironolactone (ALDACTONE) 50 MG tablet Take 1 tablet (50 mg) by mouth daily 90 tablet 1     losartan (COZAAR) 100 MG tablet Take 1 tablet (100 mg) by mouth daily 90 tablet 1     albuterol (PROAIR HFA/PROVENTIL HFA/VENTOLIN HFA) 108 (90 BASE) MCG/ACT Inhaler Inhale 2 puffs into the lungs every 6 hours as needed for shortness of breath / dyspnea or wheezing 1 Inhaler 1     Omeprazole (PRILOSEC PO) Take 20 mg by mouth       [DISCONTINUED] testosterone cypionate (DEPOTESTOTERONE) 200 MG/ML injection Inject 0.75 mLs (150 mg) into the muscle every 14 days To be administered by clinic RN 1 mL 5     No Known Allergies    Reviewed and updated as needed this visit by clinical staff  Tobacco  Allergies  Meds  Problems  Med Hx       Reviewed and updated as needed this visit by Provider  Allergies  Meds  Problems  Med Hx         ROS:  Constitutional, HEENT, cardiovascular, pulmonary, GI, , musculoskeletal, neuro, skin, endocrine and psych systems are negative, except as otherwise noted.    This document serves as a record of the services and decisions personally performed and made by Jenise Acosta PA-C. It was created on her behalf by  "David Denney, a trained medical scribe. The creation of this document is based on the provider's statements to the medical scribe.  David Denney 3:40 PM April 17, 2018    OBJECTIVE:   /82  Pulse 114  Temp 97.6  F (36.4  C) (Tympanic)  Ht 5' 6.5\" (1.689 m)  Wt 270 lb (122.5 kg)  SpO2 99%  BMI 42.93 kg/m2  Body mass index is 42.93 kg/(m^2).  GENERAL: diaphoretic, otherwise healthy, alert and no distress  EYES: blister like lesion on left inferior lid along lash line, otherwise Eyes grossly normal to inspection, PERRL and conjunctivae and sclerae normal  RESP: lungs clear to auscultation - no rales, rhonchi or wheezes  CV: regular rate and rhythm, normal S1 S2, no S3 or S4, no murmur, click or rub, no peripheral edema and peripheral pulses strong  SKIN: no suspicious lesions or rashes  PSYCH: mentation appears normal, affect normal/bright    Office Visit on 04/05/2018   Component Date Value Ref Range Status     WBC 04/05/2018 6.9  4.0 - 11.0 10e9/L Final     RBC Count 04/05/2018 4.73  4.4 - 5.9 10e12/L Final     Hemoglobin 04/05/2018 13.3  13.3 - 17.7 g/dL Final     Hematocrit 04/05/2018 38.4* 40.0 - 53.0 % Final     MCV 04/05/2018 81  78 - 100 fl Final     MCH 04/05/2018 28.1  26.5 - 33.0 pg Final     MCHC 04/05/2018 34.6  31.5 - 36.5 g/dL Final     RDW 04/05/2018 12.9  10.0 - 15.0 % Final     Platelet Count 04/05/2018 232  150 - 450 10e9/L Final     Diff Method 04/05/2018 Automated Method   Final     % Neutrophils 04/05/2018 56.1  % Final     % Lymphocytes 04/05/2018 30.8  % Final     % Monocytes 04/05/2018 10.2  % Final     % Eosinophils 04/05/2018 2.5  % Final     % Basophils 04/05/2018 0.4  % Final     Absolute Neutrophil 04/05/2018 3.9  1.6 - 8.3 10e9/L Final     Absolute Lymphocytes 04/05/2018 2.1  0.8 - 5.3 10e9/L Final     Absolute Monocytes 04/05/2018 0.7  0.0 - 1.3 10e9/L Final     Absolute Eosinophils 04/05/2018 0.2  0.0 - 0.7 10e9/L Final     Absolute Basophils 04/05/2018 0.0  " 0.0 - 0.2 10e9/L Final     Sed Rate 04/05/2018 27* 0 - 20 mm/h Final     CRP Inflammation 04/05/2018 9.4* 0.0 - 8.0 mg/L Final   Office Visit on 04/03/2018   Component Date Value Ref Range Status     Influenza A/B Agn Specimen 04/03/2018 Nasopharyngeal   Final     Influenza A 04/03/2018 Negative  NEG^Negative Final     Influenza B 04/03/2018 Negative  NEG^Negative Final    Comment: Test results must be correlated with clinical data. If necessary, results   should be confirmed by a molecular assay or viral culture.       Specimen Description 04/03/2018 Throat   Final     Rapid Strep A Screen 04/03/2018 NEGATIVE: No Group A streptococcal antigen detected by immunoassay, await culture report.   Final     Specimen Description 04/03/2018 Throat   Final     Culture Micro 04/03/2018 No beta hemolytic Streptococcus Group A isolated   Final         Chest XR from 04/05/18:  IMPRESSION: Since April 3, 2018, heart size is normal. Slight  elevation of the right hemidiaphragm. No pleural effusion,  pneumothorax, or abnormal area of consolidation    ASSESSMENT/PLAN:   Richard was seen today for cough.    Diagnoses and all orders for this visit:    SOB (shortness of breath)  Patient continues to have shortness of breath following pneumonia. Patient refused EKG despite  regarding symptoms and correlation of heart and lung symptoms. Will monitor lab work and update patient with results. Follow up pending lab work results.   -     EKG 12-lead complete w/read - Clinics (DECLINED BY PATIENT)  -     ESR: Erythrocyte sedimentation rate  -     CRP, inflammation  -     CBC with platelets and differential  -     BNP-N terminal pro  -     D dimer, quantitative    Hypogonadism male  Stable, patient doing well. Testosterone injection administered during visit today.   -     testosterone cypionate (DEPOTESTOTERONE) 200 MG/ML injection; Inject 0.75 mLs (150 mg) into the muscle every 14 days To be administered by clinic RN    SHANAE  (obstructive sleep apnea)  Sleep medicine referral provided for follow up of patients CPAP machine. Patient will schedule.   -     SLEEP EVALUATION & MANAGEMENT REFERRAL - ADULT -[facility] [place]  [phone] (Age 18 and up); Future    Eyelid cyst, left  Start OTC hydrocortisone cream to treat cyst of left eye. Recommended using medication at night before bed and advised caution to avoid getting cream into eye. Also advised warm compresses as tolerated. Follow up as needed.   -     hydrocortisone (CORTAID) 1 % cream; Apply sparingly to affected area once daily with Qtip    The information in this document, created by the medical scribe for me, accurately reflects the services I personally performed and the decisions made by me. I have reviewed and approved this document for accuracy prior to leaving the patient care area.  April 17, 2018 3:40 PM    Jenise Acosta PA-C  [facility] [place]

## 2018-04-18 LAB
CRP SERPL-MCNC: <2.9 MG/L (ref 0–8)
NT-PROBNP SERPL-MCNC: 7 PG/ML (ref 0–125)

## 2018-04-18 RX ORDER — PREDNISONE 20 MG/1
TABLET ORAL
Qty: 20 TABLET | Refills: 0 | Status: SHIPPED | OUTPATIENT
Start: 2018-04-18 | End: 2018-11-18

## 2018-06-07 ENCOUNTER — ALLIED HEALTH/NURSE VISIT (OUTPATIENT)
Dept: NURSING | Facility: CLINIC | Age: 53
End: 2018-06-07
Payer: COMMERCIAL

## 2018-06-07 DIAGNOSIS — E29.1 HYPOGONADISM MALE: Primary | ICD-10-CM

## 2018-06-07 PROCEDURE — 96372 THER/PROPH/DIAG INJ SC/IM: CPT

## 2018-06-07 PROCEDURE — 99207 ZZC NO CHARGE NURSE ONLY: CPT

## 2018-06-27 ENCOUNTER — ALLIED HEALTH/NURSE VISIT (OUTPATIENT)
Dept: NURSING | Facility: CLINIC | Age: 53
End: 2018-06-27
Payer: COMMERCIAL

## 2018-06-27 DIAGNOSIS — E29.1 HYPOGONADISM MALE: Primary | ICD-10-CM

## 2018-06-27 PROCEDURE — 99207 ZZC NO CHARGE NURSE ONLY: CPT

## 2018-06-27 PROCEDURE — 96372 THER/PROPH/DIAG INJ SC/IM: CPT

## 2018-10-01 ENCOUNTER — TELEPHONE (OUTPATIENT)
Dept: FAMILY MEDICINE | Facility: CLINIC | Age: 53
End: 2018-10-01

## 2018-10-01 ENCOUNTER — ALLIED HEALTH/NURSE VISIT (OUTPATIENT)
Dept: NURSING | Facility: CLINIC | Age: 53
End: 2018-10-01
Payer: COMMERCIAL

## 2018-10-01 DIAGNOSIS — E29.1 HYPOGONADISM MALE: Primary | ICD-10-CM

## 2018-10-01 DIAGNOSIS — I10 BENIGN ESSENTIAL HYPERTENSION: ICD-10-CM

## 2018-10-01 PROCEDURE — 99207 ZZC NO CHARGE NURSE ONLY: CPT

## 2018-10-01 PROCEDURE — 96372 THER/PROPH/DIAG INJ SC/IM: CPT

## 2018-10-01 RX ORDER — LOSARTAN POTASSIUM 100 MG/1
100 TABLET ORAL DAILY
Qty: 90 TABLET | Refills: 0 | Status: SHIPPED | OUTPATIENT
Start: 2018-10-01 | End: 2018-11-19

## 2018-10-01 RX ORDER — SPIRONOLACTONE 50 MG/1
50 TABLET, FILM COATED ORAL DAILY
Qty: 90 TABLET | Refills: 0 | Status: SHIPPED | OUTPATIENT
Start: 2018-10-01 | End: 2018-11-19

## 2018-10-01 NOTE — TELEPHONE ENCOUNTER
Pt is asking if he can have an order for a CPAP and supplies - his is over 4 years old and starting to fall apart     Pt did have sleep study in [place] 4 + years ago     What does pt need to do to get CPAP machine and supplies     [facility] # [phone] [facility]     Please review and advise     Thank you     Tiffanie Madrid RN, BSN  [facility]

## 2018-10-01 NOTE — TELEPHONE ENCOUNTER
Pt calling     Advised pt on the information below     Patient stated an understanding and agreed with plan.    Tiffanie Madrid RN, BSN  [facility]

## 2018-10-01 NOTE — TELEPHONE ENCOUNTER
Attempt # 1    Called #   Telephone Information:   Mobile [phone]         Left a non detailed VM     Tiffanie Madrid RN, BSN  [place] [facility]

## 2018-10-01 NOTE — TELEPHONE ENCOUNTER
"Last Written Prescription Date:  4/3/2018  Last Fill Quantity: 90,  # refills: 1   Last office visit: 4/17/2018 with prescribing provider:  Yes, Jenise Acosta   Future Office Visit:   Next 5 appointments (look out 90 days)     Oct 01, 2018  1:30 PM CDT   Nurse Only with [facility]   [facility]    [address] [place] [address] [phone]                   Requested Prescriptions   Pending Prescriptions Disp Refills     losartan (COZAAR) 100 MG tablet 90 tablet 1     Sig: Take 1 tablet (100 mg) by mouth daily    Angiotensin-II Receptors Failed    10/1/2018  9:46 AM       Failed - Normal serum creatinine on file in past 12 months    Recent Labs   Lab Test  07/05/17   0852   CR  1.00            Failed - Normal serum potassium on file in past 12 months    Recent Labs   Lab Test  07/05/17   0852   POTASSIUM  4.4                   Passed - Blood pressure under 140/90 in past 12 months    BP Readings from Last 3 Encounters:   04/17/18 132/82   04/05/18 128/84   04/03/18 (!) 146/92                Passed - Recent (12 mo) or future (30 days) visit within the authorizing provider's specialty    Patient had office visit in the last 12 months or has a visit in the next 30 days with authorizing provider or within the authorizing provider's specialty.  See \"Patient Info\" tab in inbasket, or \"Choose Columns\" in Meds & Orders section of the refill encounter.           Passed - Patient is age 18 or older        spironolactone (ALDACTONE) 50 MG tablet 90 tablet 1     Sig: Take 1 tablet (50 mg) by mouth daily    There is no refill protocol information for this order            Routing refill request to provider for review/approval because:  Labs not current:  Potassium and creatinine    Penny Townsend, JOSELYN, RN, PHN  [facility]  ) [phone]            "

## 2018-10-01 NOTE — TELEPHONE ENCOUNTER
Pt calling     Advised pt on the information below     Patient stated an understanding and agreed with plan.    Tiffanie Madrid RN, BSN  [facility]

## 2018-10-01 NOTE — TELEPHONE ENCOUNTER
We need to get a copy of the actual sleep study results and ensure that his weight has not changed much since that study as well (the settings/equipement can change if any significant weight changes).  I see KARUNA was sent today.    Please also see telephone refill encounter for pt that he needs an annual visit for further refills (90 day RX sent in to [facility])      Jenise Acosta MS, PA-C

## 2018-10-01 NOTE — TELEPHONE ENCOUNTER
Pt needs an annual physical as it has been over one year since we have checked his electrolyte levels/kidney function.  I will renew meds for 90 days but he needs an OV for any further refills.      Jenise Acosta MS, PA-C

## 2018-10-01 NOTE — TELEPHONE ENCOUNTER
Reason for Call:  Medication or medication refill:    Do you use a [facility]?  Name of the pharmacy and phone number for the current request:  [facility] - [phone]    Name of the medication requested: Water pill & losartan    Other request: Pt called this morning and would like a refill on his losartan as well as his water pil (which I was unable to find on the pt's medication list). Please refill these ASAP. THank you.    Can we leave a detailed message on this number? YES    Phone number patient can be reached at: Home number on file [phone] (home)    Best Time:     Call taken on 10/1/2018 at 8:46 AM by Candice Houston

## 2018-10-01 NOTE — TELEPHONE ENCOUNTER
Called # below     Left a non detailed VM     Tiffanie Madrid RN, BSN  [facility]

## 2018-10-16 ENCOUNTER — TELEPHONE (OUTPATIENT)
Dept: FAMILY MEDICINE | Facility: CLINIC | Age: 53
End: 2018-10-16

## 2018-10-16 ENCOUNTER — ALLIED HEALTH/NURSE VISIT (OUTPATIENT)
Dept: NURSING | Facility: CLINIC | Age: 53
End: 2018-10-16
Payer: COMMERCIAL

## 2018-10-16 DIAGNOSIS — E29.1 HYPOGONADISM MALE: Primary | ICD-10-CM

## 2018-10-16 DIAGNOSIS — G47.33 OSA ON CPAP: Primary | ICD-10-CM

## 2018-10-16 PROCEDURE — 99207 ZZC NO CHARGE NURSE ONLY: CPT

## 2018-10-16 PROCEDURE — 96372 THER/PROPH/DIAG INJ SC/IM: CPT

## 2018-10-16 NOTE — PROGRESS NOTES
Testosterone injection given today.  See med note on medication list.    Penny Townsend, JOSELYN, RN, PHN  [facility]) [phone]

## 2018-10-16 NOTE — MR AVS SNAPSHOT
After Visit Summary   10/16/2018    Richard Ann    MRN: [id number]           Patient Information     Date Of Birth          1965        Visit Information        Provider Department      10/16/2018 1:45 PM [facility] ANTICOAGULATION [facility]IN[facility]        Today's Diagnoses     Hypogonadism male    -  1       Follow-ups after your visit        Who to contact     If you have questions or need follow up information about today's clinic visit or your schedule please contact [facility] directly at [phone].  Normal or non-critical lab and imaging results will be communicated to you by MyChart, letter or phone within 4 business days after the clinic has received the results. If you do not hear from us within 7 days, please contact the clinic through [employer] or phone. If you have a critical or abnormal lab result, we will notify you by phone as soon as possible.  Submit refill requests through [employer] or call your pharmacy and they will forward the refill request to us. Please allow 3 business days for your refill to be completed.          Additional Information About Your Visit        MyChart Information     [employer] gives you secure access to your electronic health record. If you see a primary care provider, you can also send messages to your care team and make appointments. If you have questions, please call your primary care clinic.  If you do not have a primary care provider, please call [phone] and they will assist you.        Care EveryWhere ID     This is your Care EveryWhere ID. This could be used by other organizations to access your [facility] medical records  [id number]         Blood Pressure from Last 3 Encounters:   04/17/18 132/82   04/05/18 128/84   04/03/18 (!) 146/92    Weight from Last 3 Encounters:   04/17/18 270 lb (122.5 kg)   04/05/18 268 lb (121.6 kg)   04/03/18 268 lb (121.6 kg)              We Performed the Following     INJECTION  INTRAMUSCULAR OR SUB-Q        Primary Care Provider Office Phone # Fax #    Jenise Acosta PA-C [phone]       [address] [place] [id number]        Equal Access to Services     NAHEED NELSON : Hadivet hodan dover nolbertoo Soronnieali, waaxda luqadaha, qaybta kaalmada adeegyada, darwin owenn martinateresa akers bianca jones. So [facility] [phone].    ATENCIÓN: Si habla español, tiene a martin disposición servicios gratuitos de asistencia lingüística. Llame al [phone].    We comply with applicable federal civil rights laws and [place] laws. We do not discriminate on the basis of race, color, national origin, age, disability, sex, sexual orientation, or gender identity.            Thank you!     Thank you for choosing [facility]  for your care. Our goal is always to provide you with excellent care. Hearing back from our patients is one way we can continue to improve our services. Please take a few minutes to complete the written survey that you may receive in the mail after your visit with us. Thank you!             Your Updated Medication List - Protect others around you: Learn how to safely use, store and throw away your medicines at www.disposemymeds.org.          This list is accurate as of 10/16/18  2:03 PM.  Always use your most recent med list.                   Brand Name Dispense Instructions for use Diagnosis    * albuterol 108 (90 Base) MCG/ACT inhaler    PROAIR HFA/PROVENTIL HFA/VENTOLIN HFA    1 Inhaler    Inhale 2 puffs into the lungs every 6 hours as needed for shortness of breath / dyspnea or wheezing    SOB (shortness of breath)       * albuterol 108 (90 Base) MCG/ACT inhaler    PROAIR HFA/PROVENTIL HFA/VENTOLIN HFA    1 Inhaler    Inhale 2 puffs into the lungs every 6 hours as needed for shortness of breath / dyspnea or wheezing    Pneumonia due to infectious organism, unspecified laterality, unspecified part of lung       hydrocortisone 1 % cream    CORTAID    30  g    Apply sparingly to affected area once daily with Qtip    Eyelid cyst, left       losartan 100 MG tablet    COZAAR    90 tablet    Take 1 tablet (100 mg) by mouth daily Office visit required for further fills    Benign essential hypertension       predniSONE 20 MG tablet    DELTASONE    20 tablet    Take 60 mg daily for 3 days, then 40 mg daily for 3 days, then 20 mg daily for 3 days, then 10 mg for 4 days    SOB (shortness of breath)       PRILOSEC PO      Take 20 mg by mouth        spironolactone 50 MG tablet    ALDACTONE    90 tablet    Take 1 tablet (50 mg) by mouth daily Office visit required for further fills    Benign essential hypertension       testosterone cypionate 200 MG/ML injection    DEPOTESTOSTERONE    1 mL    Inject 0.75 mLs (150 mg) into the muscle every 14 days To be administered by clinic RN    Hypogonadism male       * Notice:  This list has 2 medication(s) that are the same as other medications prescribed for you. Read the directions carefully, and ask your doctor or other care provider to review them with you.

## 2018-10-16 NOTE — TELEPHONE ENCOUNTER
Patient presented to clinic today for RN only testosterone injection.    He asked if we received records yet for sleep study from [facility] in [place].      He also asked if he can get Diamox for his trip to [place] on 10/23/208. He will be there for 4 days. He said he has history of altitude sickness. [facility].      Routing to  to review and advise.  Have you received sleep study records.      Patient can be reached at [phone].      JOSELYN Priest, RN, N  [facility]) [phone]

## 2018-10-17 NOTE — TELEPHONE ENCOUNTER
Pt was not happy about having to do any type of visit.  Advised triage they had this prescribed previously. Triage noted that was from [facility] 7/22/2017, and that this has not been prescribed from [facility].    Pt was sent instructions for an Evisit over [employer].    Staci Maharaj RN  [place] Triage

## 2018-10-17 NOTE — TELEPHONE ENCOUNTER
Naty Maharaj contacted Richard on 10/17/18 and left a message. If patient calls back please contact RN team.  Staci Maharaj RN  [facility]

## 2018-10-17 NOTE — TELEPHONE ENCOUNTER
TEAM: I have note received the sleep study.  KARUNA scanned into chart.  Please follow up.    TRIAGE: Regarding the Diamox.  Since I have never filled this for him before he can request an E-visit in lieu of an OV for this RX.      Jenise Acosta MS, PA-C

## 2018-10-17 NOTE — TELEPHONE ENCOUNTER
Called [facility] at [phone]  and requested records be sent.  They stated they will send the request to their manager and get them sent ASAP.    Veronica Jha

## 2018-10-31 ENCOUNTER — ALLIED HEALTH/NURSE VISIT (OUTPATIENT)
Dept: NURSING | Facility: CLINIC | Age: 53
End: 2018-10-31
Payer: COMMERCIAL

## 2018-10-31 DIAGNOSIS — E29.1 HYPOGONADISM MALE: Primary | ICD-10-CM

## 2018-10-31 DIAGNOSIS — E29.1 HYPOGONADISM MALE: ICD-10-CM

## 2018-10-31 PROCEDURE — 96372 THER/PROPH/DIAG INJ SC/IM: CPT

## 2018-10-31 PROCEDURE — 99207 ZZC NO CHARGE NURSE ONLY: CPT

## 2018-10-31 RX ORDER — TESTOSTERONE CYPIONATE 200 MG/ML
150 INJECTION, SOLUTION INTRAMUSCULAR
Qty: 1 ML | Refills: 5 | Status: SHIPPED | OUTPATIENT
Start: 2018-10-31 | End: 2019-03-26

## 2018-10-31 NOTE — TELEPHONE ENCOUNTER
Routing refill request to provider for review/approval because:  Drug not on the [facility] refill protocol   Staci Maharaj RN  [place] [facility]riage

## 2018-10-31 NOTE — TELEPHONE ENCOUNTER
Reason for Call:  Medication or medication refill:    Do you use a [facility]?  Name of the pharmacy and phone number for the current request:  [facility]    Name of the medication requested: testosterone cypionate (DEPOTESTOTERONE) 200 MG/ML injection    Other request: The patient says he is scheduled for 3:15 today to have this shot. He says Jenise needs to refill it so he can get this today.    Can we leave a detailed message on this number? YES    Phone number patient can be reached at: Cell number on file:    Telephone Information:   Mobile [phone]     Best Time: Anytime    Call taken on 10/31/2018 at 8:57 AM by Goldie Coker

## 2018-11-01 NOTE — TELEPHONE ENCOUNTER
[phone] medical records/KARUNA for all [facility].    Phone: [phone]  F: [phone]   [address] [place],[address]   [place] [address]     Left a detailed vm for them to call us back.    Staci Maharaj RN  [facility]

## 2018-11-01 NOTE — TELEPHONE ENCOUNTER
Called pt and advised of below. He is happy this is going forward.    Staci Maharaj RN  [place] Triage

## 2018-11-01 NOTE — TELEPHONE ENCOUNTER
Spoke to [facility] KARUNA.  They received our request on 10/15, the processing time is at least 30 days.  This has been moved to STAT processing and will be sent ASAP.    Veronica Jha

## 2018-11-01 NOTE — TELEPHONE ENCOUNTER
Pt reports they have not heard back about [facility] and their sleep apnea machine. They said it is about to break because it's so old and they need this taken care of sooner than later.  Please call [facility] per below about records during regular business hours today. Call pt back at [phone] when done.     Staci Maharaj RN  [facility]

## 2018-11-06 NOTE — TELEPHONE ENCOUNTER
Patient notified by phone.  Given sleep scheduling number.    He verbalized understanding and agreed with plan.    JOSELYN Priest, RN, N  [facility]) [phone]

## 2018-11-06 NOTE — TELEPHONE ENCOUNTER
Please advise patient that I finally received his sleep study results from 5/9/2014.  At that point in time he was found to have severe obstructive sleep apnea and was 236 pounds at the time of the study.  Due to his weight difference and the amount of time that has elapsed following up with the sleep center is advised as his settings are likely quite a bit different than previous.  I will have these records scanned into his chart and a stat manner so that they are able to be referenced by the sleep medicine team.  I did place a new referral today.    Wt Readings from Last 5 Encounters:   04/17/18 270 lb (122.5 kg)   04/05/18 268 lb (121.6 kg)   04/03/18 268 lb (121.6 kg)   09/22/17 251 lb (113.9 kg)   08/19/17 261 lb (118.4 kg)

## 2018-11-18 PROBLEM — I10 HYPERTENSION GOAL BP (BLOOD PRESSURE) < 140/90: Status: RESOLVED | Noted: 2017-08-19 | Resolved: 2018-11-18

## 2018-11-19 ENCOUNTER — OFFICE VISIT (OUTPATIENT)
Dept: FAMILY MEDICINE | Facility: CLINIC | Age: 53
End: 2018-11-19
Payer: COMMERCIAL

## 2018-11-19 VITALS
SYSTOLIC BLOOD PRESSURE: 130 MMHG | HEART RATE: 86 BPM | TEMPERATURE: 98.4 F | WEIGHT: 273 LBS | DIASTOLIC BLOOD PRESSURE: 88 MMHG | HEIGHT: 67 IN | BODY MASS INDEX: 42.85 KG/M2 | OXYGEN SATURATION: 96 %

## 2018-11-19 DIAGNOSIS — K21.00 GASTROESOPHAGEAL REFLUX DISEASE WITH ESOPHAGITIS: ICD-10-CM

## 2018-11-19 DIAGNOSIS — Z00.00 ROUTINE HISTORY AND PHYSICAL EXAMINATION OF ADULT: Primary | ICD-10-CM

## 2018-11-19 DIAGNOSIS — Z12.11 SCREEN FOR COLON CANCER: ICD-10-CM

## 2018-11-19 DIAGNOSIS — Z13.0 SCREENING FOR DEFICIENCY ANEMIA: ICD-10-CM

## 2018-11-19 DIAGNOSIS — Z12.5 SCREENING PSA (PROSTATE SPECIFIC ANTIGEN): ICD-10-CM

## 2018-11-19 DIAGNOSIS — Z23 NEED FOR PROPHYLACTIC VACCINATION AND INOCULATION AGAINST INFLUENZA: ICD-10-CM

## 2018-11-19 DIAGNOSIS — I10 BENIGN ESSENTIAL HYPERTENSION: ICD-10-CM

## 2018-11-19 DIAGNOSIS — Z11.4 ENCOUNTER FOR SCREENING FOR HIV: ICD-10-CM

## 2018-11-19 DIAGNOSIS — Z80.0 FAMILY HISTORY OF COLON CANCER IN FATHER: ICD-10-CM

## 2018-11-19 DIAGNOSIS — G47.33 OSA (OBSTRUCTIVE SLEEP APNEA): ICD-10-CM

## 2018-11-19 DIAGNOSIS — E66.01 MORBID OBESITY (H): ICD-10-CM

## 2018-11-19 DIAGNOSIS — Z23 NEED FOR PROPHYLACTIC VACCINATION WITH TETANUS-DIPHTHERIA (TD): ICD-10-CM

## 2018-11-19 DIAGNOSIS — F41.8 SITUATIONAL ANXIETY: ICD-10-CM

## 2018-11-19 DIAGNOSIS — E78.5 HYPERLIPIDEMIA WITH TARGET LDL LESS THAN 100: ICD-10-CM

## 2018-11-19 DIAGNOSIS — E29.1 HYPOGONADISM MALE: ICD-10-CM

## 2018-11-19 LAB
ALBUMIN SERPL-MCNC: 4.4 G/DL (ref 3.4–5)
ALP SERPL-CCNC: 69 U/L (ref 40–150)
ALT SERPL W P-5'-P-CCNC: 41 U/L (ref 0–70)
ANION GAP SERPL CALCULATED.3IONS-SCNC: 6 MMOL/L (ref 3–14)
AST SERPL W P-5'-P-CCNC: 20 U/L (ref 0–45)
BILIRUB SERPL-MCNC: 1.1 MG/DL (ref 0.2–1.3)
BUN SERPL-MCNC: 14 MG/DL (ref 7–30)
CALCIUM SERPL-MCNC: 9.2 MG/DL (ref 8.5–10.1)
CHLORIDE SERPL-SCNC: 107 MMOL/L (ref 94–109)
CHOLEST SERPL-MCNC: 226 MG/DL
CO2 SERPL-SCNC: 26 MMOL/L (ref 20–32)
CREAT SERPL-MCNC: 0.96 MG/DL (ref 0.66–1.25)
CREAT UR-MCNC: 333 MG/DL
ERYTHROCYTE [DISTWIDTH] IN BLOOD BY AUTOMATED COUNT: 13.7 % (ref 10–15)
GFR SERPL CREATININE-BSD FRML MDRD: 82 ML/MIN/1.7M2
GLUCOSE SERPL-MCNC: 99 MG/DL (ref 70–99)
HCT VFR BLD AUTO: 43.9 % (ref 40–53)
HDLC SERPL-MCNC: 45 MG/DL
HGB BLD-MCNC: 14.7 G/DL (ref 13.3–17.7)
LDLC SERPL CALC-MCNC: 146 MG/DL
MCH RBC QN AUTO: 28.1 PG (ref 26.5–33)
MCHC RBC AUTO-ENTMCNC: 33.5 G/DL (ref 31.5–36.5)
MCV RBC AUTO: 84 FL (ref 78–100)
MICROALBUMIN UR-MCNC: 20 MG/L
MICROALBUMIN/CREAT UR: 6.16 MG/G CR (ref 0–17)
NONHDLC SERPL-MCNC: 181 MG/DL
PLATELET # BLD AUTO: 152 10E9/L (ref 150–450)
POTASSIUM SERPL-SCNC: 4.3 MMOL/L (ref 3.4–5.3)
PROT SERPL-MCNC: 8 G/DL (ref 6.8–8.8)
PSA SERPL-ACNC: 1.94 UG/L (ref 0–4)
RBC # BLD AUTO: 5.23 10E12/L (ref 4.4–5.9)
SODIUM SERPL-SCNC: 139 MMOL/L (ref 133–144)
TRIGL SERPL-MCNC: 177 MG/DL
WBC # BLD AUTO: 4.5 10E9/L (ref 4–11)

## 2018-11-19 PROCEDURE — 84403 ASSAY OF TOTAL TESTOSTERONE: CPT | Performed by: PHYSICIAN ASSISTANT

## 2018-11-19 PROCEDURE — 99396 PREV VISIT EST AGE 40-64: CPT | Mod: 25 | Performed by: PHYSICIAN ASSISTANT

## 2018-11-19 PROCEDURE — 36415 COLL VENOUS BLD VENIPUNCTURE: CPT | Performed by: PHYSICIAN ASSISTANT

## 2018-11-19 PROCEDURE — 87389 HIV-1 AG W/HIV-1&-2 AB AG IA: CPT | Performed by: PHYSICIAN ASSISTANT

## 2018-11-19 PROCEDURE — 90471 IMMUNIZATION ADMIN: CPT | Performed by: PHYSICIAN ASSISTANT

## 2018-11-19 PROCEDURE — 85027 COMPLETE CBC AUTOMATED: CPT | Performed by: PHYSICIAN ASSISTANT

## 2018-11-19 PROCEDURE — G0103 PSA SCREENING: HCPCS | Performed by: PHYSICIAN ASSISTANT

## 2018-11-19 PROCEDURE — 80061 LIPID PANEL: CPT | Performed by: PHYSICIAN ASSISTANT

## 2018-11-19 PROCEDURE — 90715 TDAP VACCINE 7 YRS/> IM: CPT | Performed by: PHYSICIAN ASSISTANT

## 2018-11-19 PROCEDURE — 84270 ASSAY OF SEX HORMONE GLOBUL: CPT | Performed by: PHYSICIAN ASSISTANT

## 2018-11-19 PROCEDURE — 80053 COMPREHEN METABOLIC PANEL: CPT | Performed by: PHYSICIAN ASSISTANT

## 2018-11-19 PROCEDURE — 82043 UR ALBUMIN QUANTITATIVE: CPT | Performed by: PHYSICIAN ASSISTANT

## 2018-11-19 RX ORDER — SPIRONOLACTONE 50 MG/1
50 TABLET, FILM COATED ORAL DAILY
Qty: 90 TABLET | Refills: 1 | Status: SHIPPED | OUTPATIENT
Start: 2018-11-19 | End: 2019-06-20

## 2018-11-19 RX ORDER — LOSARTAN POTASSIUM 100 MG/1
100 TABLET ORAL DAILY
Qty: 90 TABLET | Refills: 1 | Status: SHIPPED | OUTPATIENT
Start: 2018-11-19 | End: 2019-06-20

## 2018-11-19 NOTE — PROGRESS NOTES
SUBJECTIVE:   CC: Richard Ann is an 53 year old male who presents for preventative health visit.     Healthy Habits:    Do you get at least three servings of calcium containing foods daily (dairy, green leafy vegetables, etc.)? yes    Amount of exercise or daily activities, outside of work: swim a mile per day    Problems taking medications regularly No    Medication side effects: No    Have you had an eye exam in the past two years? yes    Do you see a dentist twice per year? yes    Do you have sleep apnea, excessive snoring or daytime drowsiness? Yes - has a cpap     Hypertension Follow-up      Outpatient blood pressures are not being checked.    Low Salt Diet: low salt    Patient takes 100 mg losartan once daily. He also takes 50 mg spironolactone once daily to regulate BP. He does not regularly check his BP. He endorses he knows when his BP is elevated. He typically experiences headaches if BP is elevated. His BP was slightly elevated today. He did not take his BP medication today since he relates he was told not to for fasting labs.    BP Readings from Last 5 Encounters:   11/19/18 130/88   04/17/18 132/82   04/05/18 128/84   04/03/18 (!) 146/92   09/22/17 116/80     SHANAE  He had a sleep study done at [facility] in [place] on 05/09/2014. Sleep study showed that he had severe sleep apnea and was 236 lbs at time of study. He uses a CPAP at night. He was recommended for a repeat sleep evaluation due to amount of time that has pasted since last evaluation. His CPAP settings are most likely different than previously. He is scheduled to establish a sleep doctor in December 2018.     GERD  Patient takes 20 mg omeprazole once daily and this works well to control his GERD. He has been taking omeprazole for last 6 months.  Has never had an endoscopy    Hypogonadism  Patient receives 200 mg depotestosterone injections every 14 days by RN at clinic. He has been tolerating well. His last injection was on  10/31/2018.    Health maintanence:   Colonoscopy: Last performed on 2017, results showed 5 mm polyp which was removed, due 2022  PSA: No record of previous PSA  Endoscopy: No record of previous endoscopy  Immunizations: Due for TDAP    Today's PHQ-2 Score: No flowsheet data found.    Abuse: Current or Past(Physical, Sexual or Emotional)- No  Do you feel safe in your environment - Yes    Social History   Substance Use Topics     Smoking status: Never Smoker     Smokeless tobacco: Never Used     Alcohol use Yes      Comment: 4 drinks per week      If you drink alcohol do you typically have >3 drinks per day or >7 drinks per week? No                      Last PSA: No results found for: PSA    Reviewed orders with patient. Reviewed health maintenance and updated orders accordingly - Yes  BP Readings from Last 3 Encounters:   18 130/88   18 132/82   18 128/84    Wt Readings from Last 3 Encounters:   18 273 lb (123.8 kg)   18 270 lb (122.5 kg)   18 268 lb (121.6 kg)         Patient Active Problem List   Diagnosis     Family history of colon cancer in father     Hypogonadism male     Benign essential hypertension     Situational anxiety     Morbid obesity (H)     SHANAE (obstructive sleep apnea)     Past Surgical History:   Procedure Laterality Date     NO HISTORY OF SURGERY         Social History   Substance Use Topics     Smoking status: Never Smoker     Smokeless tobacco: Never Used     Alcohol use Yes      Comment: 4 drinks per week     Family History   Problem Relation Age of Onset     Colon Cancer Father 72      one year after diagnosis         Current Outpatient Prescriptions   Medication Sig Dispense Refill     losartan (COZAAR) 100 MG tablet Take 1 tablet (100 mg) by mouth daily Office visit required for further fills 90 tablet 0     Omeprazole (PRILOSEC PO) Take 20 mg by mouth       spironolactone (ALDACTONE) 50 MG tablet Take 1 tablet (50 mg) by mouth daily  "Office visit required for further fills 90 tablet 0     testosterone cypionate (DEPOTESTOSTERONE) 200 MG/ML injection Inject 0.75 mLs (150 mg) into the muscle every 14 days To be administered by clinic RN 1 mL 5     No Known Allergies  Recent Labs   Lab Test  07/05/17   0852   LDL  122*   HDL  42   TRIG  151*   ALT  57   CR  1.00   GFRESTIMATED  79   GFRESTBLACK  >90   GFR Calc     POTASSIUM  4.4   TSH  1.58      Reviewed and updated as needed this visit by clinical staff  Tobacco  Med Hx  Surg Hx  Fam Hx  Soc Hx      Reviewed and updated as needed this visit by Provider  Tobacco  Med Hx  Surg Hx  Fam Hx  Soc Hx       Past Medical History:   Diagnosis Date     Benign essential hypertension 7/5/2017     Family history of colon cancer in father 7/5/2017     Hypogonadism male 07/05/2017    [place], has been off x 1 year (as of 7/2017)     Situational anxiety 07/05/2017    due to divorce      Past Surgical History:   Procedure Laterality Date     NO HISTORY OF SURGERY       ROS:  Constitutional, HEENT, cardiovascular, pulmonary, GI, , musculoskeletal, neuro, skin, endocrine and psych systems are negative, except as otherwise noted.    This document serves as a record of the services and decisions personally performed and made by Jenise Acosta PA-C. It was created on her behalf by Lisha Pineda, a trained medical scribe. The creation of this document is based on the provider's statements to the medical scribe.  Lisha Pineda 8:32 AM November 19, 2018  OBJECTIVE:   /88 (BP Location: Left arm, Patient Position: Chair, Cuff Size: Adult Large)  Pulse 86  Temp 98.4  F (36.9  C) (Oral)  Ht 1.689 m (5' 6.5\")  Wt 123.8 kg (273 lb)  SpO2 96%  BMI 43.4 kg/m2 Body mass index is 43.4 kg/(m^2).    GENERAL: healthy, alert and no distress  EYES: Eyes grossly normal to inspection, PERRL and conjunctivae and sclerae normal  HENT: ear canals and TM's normal, nose and mouth without ulcers or " lesions  NECK: no adenopathy, no asymmetry, masses, or scars and thyroid normal to palpation  RESP: lungs clear to auscultation - no rales, rhonchi or wheezes  CV: regular rate and rhythm, normal S1 S2, no S3 or S4, no murmur, click or rub, no peripheral edema and peripheral pulses strong  ABDOMEN: soft, nontender, no hepatosplenomegaly, no masses and bowel sounds normal   (male): deferred  Rectal: deferred  MS: no gross musculoskeletal defects noted, no edema  SKIN: no suspicious lesions or rashes  NEURO: Normal strength and tone, mentation intact and speech normal  PSYCH: mentation appears normal, affect normal/bright    Diagnostic Test Results:  Results for orders placed or performed in visit on 11/19/18 (from the past 24 hour(s))   CBC with platelets   Result Value Ref Range    WBC 4.5 4.0 - 11.0 10e9/L    RBC Count 5.23 4.4 - 5.9 10e12/L    Hemoglobin 14.7 13.3 - 17.7 g/dL    Hematocrit 43.9 40.0 - 53.0 %    MCV 84 78 - 100 fl    MCH 28.1 26.5 - 33.0 pg    MCHC 33.5 31.5 - 36.5 g/dL    RDW 13.7 10.0 - 15.0 %    Platelet Count 152 150 - 450 10e9/L     ASSESSMENT/PLAN:   Diagnoses and all orders for this visit:    Richard was seen today for physical.    Diagnoses and all orders for this visit:    Routine history and physical examination of adult  Discussed healthy diet and routine exercise.    Morbid obesity (H)  Unchanged. His weight has been increasing steadily since last year.     SHANAE (obstructive sleep apnea)  His last sleep study was done on 05/09/2014 at [facility] in [place]. Patient is scheduled in December to establish a new sleep doctor and for sleep evaluation. He uses a CPAP at night.     Benign essential hypertension  Stable. He reports he did not take his BP medication today since he was told not to take for fasting labs. Continue 100 mg losartan once daily. Continue 50 mg spironolactone once daily. Advised in future he can take BP medication with water if coming in for labs.   -      Comprehensive metabolic panel  -     Albumin Random Urine Quantitative with Creat Ratio  -     losartan (COZAAR) 100 MG tablet; Take 1 tablet (100 mg) by mouth daily  -     spironolactone (ALDACTONE) 50 MG tablet; Take 1 tablet (50 mg) by mouth daily    Hypogonadism male  Stable. Labs ordered for evaluation  -     Testosterone Free and Total    Situational anxiety  Stable    Hyperlipidemia with target LDL less than 100  Labs ordered for evaluation  -     Lipid panel reflex to direct LDL Fasting    Gastroesophageal reflux disease with esophagitis  Stable. Continue 20 mg omeprazole once daily. Recommended patient to do endoscopy every 5 years due to GERD. Advised him to schedule for endoscopy during same time as colonoscopy.   -     omeprazole (PRILOSEC) 20 MG CR capsule; Take 1 capsule (20 mg) by mouth daily    Family history of colon cancer in father, Screen for colon cancer  Patient has family history of colon cancer in father. Patient is due for colonoscopy on 08/03/2022.  -     Fecal colorectal cancer screen (FIT); Future    Screening for deficiency anemia  Routine screening  -     CBC with platelets    Screening PSA (prostate specific antigen)  Routine screening  -     PSA, screen    Encounter for screening for HIV  Routine screening  -     HIV Antigen Antibody Combo    Need for prophylactic vaccination and inoculation against influenza  Patient has declined flu shot today    Need for prophylactic vaccination with tetanus-diphtheria (Td)  Administered in office  -          ADMIN VACCINE, FIRST  -     TDAP VACCINE (ADACEL)    Return in about 1 year (around 11/19/2019) for Physical Exam, Lab Work.    The information in this document, created by the medical scribe for me, accurately reflects the services I personally performed and the decisions made by me. I have reviewed and approved this document for accuracy prior to leaving the patient care area.  November 19, 2018 8:42 AM    COLLEEN May  [facility]

## 2018-11-19 NOTE — MR AVS SNAPSHOT
After Visit Summary   11/19/2018    Richard Ann    MRN: [id number]           Patient Information     Date Of Birth          1965        Visit Information        Provider Department      11/19/2018 8:20 AM Jenise Acosta PA-C [facility]        Today's Diagnoses     Routine history and physical examination of adult    -  1    Morbid obesity (H)        SHANAE (obstructive sleep apnea)        Benign essential hypertension        Hypogonadism male        Situational anxiety        Hyperlipidemia with target LDL less than 100        Gastroesophageal reflux disease with esophagitis        Family history of colon cancer in father        Screen for colon cancer        Screening for deficiency anemia        Screening PSA (prostate specific antigen)        Encounter for screening for HIV        Need for prophylactic vaccination and inoculation against influenza        Need for prophylactic vaccination with tetanus-diphtheria (Td)           Follow-ups after your visit        Follow-up notes from your care team     Return in about 1 year (around 11/19/2019) for Physical Exam, Lab Work.      Your next 10 appointments already scheduled     Dec 03, 2018  2:00 PM CST   New Sleep Patient with Kobi Tracey MD   [facility] ([facility])    [address] [place] [id number] [phone]              Future tests that were ordered for you today     Open Future Orders        Priority Expected Expires Ordered    Fecal colorectal cancer screen (FIT) Routine 12/9/2018 2/10/2019 11/19/2018            Who to contact     If you have questions or need follow up information about today's clinic visit or your schedule please contact [facility] directly at [phone].  Normal or non-critical lab and imaging results will be communicated to you by MyChart, letter or phone within 4 business days after the clinic has  "received the results. If you do not hear from us within 7 days, please contact the clinic through [employer] or phone. If you have a critical or abnormal lab result, we will notify you by phone as soon as possible.  Submit refill requests through [employer] or call your pharmacy and they will forward the refill request to us. Please allow 3 business days for your refill to be completed.          Additional Information About Your Visit        [employer] Information     [employer] gives you secure access to your electronic health record. If you see a primary care provider, you can also send messages to your care team and make appointments. If you have questions, please call your primary care clinic.  If you do not have a primary care provider, please call [phone] and they will assist you.        Care EveryWhere ID     This is your Care EveryWhere ID. This could be used by other organizations to access your [place] medical records  [id number]        Your Vitals Were     Pulse Temperature Height Pulse Oximetry BMI (Body Mass Index)       86 98.4  F (36.9  C) (Oral) 5' 6.5\" (1.689 m) 96% 43.4 kg/m2        Blood Pressure from Last 3 Encounters:   11/19/18 130/88   04/17/18 132/82   04/05/18 128/84    Weight from Last 3 Encounters:   11/19/18 273 lb (123.8 kg)   04/17/18 270 lb (122.5 kg)   04/05/18 268 lb (121.6 kg)              We Performed the Following          ADMIN VACCINE, FIRST     Albumin Random Urine Quantitative with Creat Ratio     CBC with platelets     Comprehensive metabolic panel     HIV Antigen Antibody Combo     Lipid panel reflex to direct LDL Fasting     PSA, screen     TDAP VACCINE (ADACEL)     Testosterone Free and Total          Today's Medication Changes          These changes are accurate as of 11/19/18  8:37 AM.  If you have any questions, ask your nurse or doctor.               These medicines have changed or have updated prescriptions.        Dose/Directions    losartan 100 MG tablet   Commonly " known as:  COZAAR   This may have changed:  additional instructions   Used for:  Benign essential hypertension   Changed by:  Jenise Acosta PA-C        Dose:  100 mg   Take 1 tablet (100 mg) by mouth daily   Quantity:  90 tablet   Refills:  1       omeprazole 20 MG CR capsule   Commonly known as:  priLOSEC   This may have changed:    - medication strength  - when to take this   Used for:  Gastroesophageal reflux disease with esophagitis   Changed by:  Jenise Acosta PA-C        Dose:  20 mg   Take 1 capsule (20 mg) by mouth daily   Quantity:  90 capsule   Refills:  3       spironolactone 50 MG tablet   Commonly known as:  ALDACTONE   This may have changed:  additional instructions   Used for:  Benign essential hypertension   Changed by:  Jenise Acosta PA-C        Dose:  50 mg   Take 1 tablet (50 mg) by mouth daily   Quantity:  90 tablet   Refills:  1         Stop taking these medicines if you haven't already. Please contact your care team if you have questions.     albuterol 108 (90 Base) MCG/ACT inhaler   Commonly known as:  PROAIR HFA/PROVENTIL HFA/VENTOLIN HFA   Stopped by:  Jenise Acosta PA-C           hydrocortisone 1 % cream   Commonly known as:  CORTAID   Stopped by:  Jenise Acosta PA-C                Where to get your medicines      These medications were sent to [facility] [place] [address]     Phone:  [phone]     losartan 100 MG tablet    spironolactone 50 MG tablet         Some of these will need a paper prescription and others can be bought over the counter.  Ask your nurse if you have questions.     You don't need a prescription for these medications     omeprazole 20 MG CR capsule                Primary Care Provider Office Phone # Fax #    Jenise Acosta PA-C [phone]       [address] [place] [address]        Equal Access to Services      NAHEED NELSON : Hadii aad ku chitra Traore, waaxda luqadaha, qaybta kaalmada martinarlsofia, waxsmiley tian haymartha palumbotoyleonarda valenzuela . So [facility] [phone].    ATENCIÓN: Si habla español, tiene a martin disposición servicios gratuitos de asistencia lingüística. Llame al [phone].    We comply with applicable federal civil rights laws and [place] laws. We do not discriminate on the basis of race, color, national origin, age, disability, sex, sexual orientation, or gender identity.            Thank you!     Thank you for choosing [facility]  for your care. Our goal is always to provide you with excellent care. Hearing back from our patients is one way we can continue to improve our services. Please take a few minutes to complete the written survey that you may receive in the mail after your visit with us. Thank you!             Your Updated Medication List - Protect others around you: Learn how to safely use, store and throw away your medicines at www.disposemymeds.org.          This list is accurate as of 11/19/18  8:37 AM.  Always use your most recent med list.                   Brand Name Dispense Instructions for use Diagnosis    losartan 100 MG tablet    COZAAR    90 tablet    Take 1 tablet (100 mg) by mouth daily    Benign essential hypertension       omeprazole 20 MG CR capsule    priLOSEC    90 capsule    Take 1 capsule (20 mg) by mouth daily    Gastroesophageal reflux disease with esophagitis       spironolactone 50 MG tablet    ALDACTONE    90 tablet    Take 1 tablet (50 mg) by mouth daily    Benign essential hypertension       testosterone cypionate 200 MG/ML injection    DEPOTESTOSTERONE    1 mL    Inject 0.75 mLs (150 mg) into the muscle every 14 days To be administered by clinic RN    Hypogonadism male

## 2018-11-20 LAB — HIV 1+2 AB+HIV1 P24 AG SERPL QL IA: NONREACTIVE

## 2018-11-21 LAB
SHBG SERPL-SCNC: 23 NMOL/L (ref 11–80)
TESTOST FREE SERPL-MCNC: 4.05 NG/DL (ref 4.7–24.4)
TESTOST SERPL-MCNC: 177 NG/DL (ref 240–950)

## 2018-11-23 NOTE — PROGRESS NOTES
Richard  I have reviewed your recent labs. Here are the results:    -Normal red blood cell (hgb) levels, normal white blood cell count and normal platelet levels.  -PSA (prostate specific antigen) test is normal.  This indicates a low likelihood of prostate cancer.  ADVISE: rechecking this in 1 year.  -LDL(bad) cholesterol level is elevated, and your triglycerides are elevated which can increase your heart disease risk.  A diet high in fat and simple carbohydrates, genetics and being overweight can contribute to this. ADVISE: exercising 150 minutes of aerobic exercise per week (30 minutes for 5 days per week or 50 minutes for 3 days per week are options), eating a low saturated fat/low carbohydrate diet, and omega-3 fatty acids (fish oil) 5493-8450 mg daily are helpful to improve this. In 6 months, you should recheck your fasting cholesterol panel.  -Your testosterone level is low, which is expected as you were due for your injection on the day of the blood draw.  -Liver and gallbladder tests are normal (ALT,AST, Alk phos, bilirubin), kidney function is normal (Cr, GFR), sodium is normal, potassium is normal, calcium is normal, glucose is normal.  -HIV test is normal.  -Microalbumin (urine protein) test is normal.  ADVISE: rechecking this annually.  For additional lab test information, labtestsonline.org is an excellent reference.     If you have any questions please do not hesitate to contact our office via phone ([phone]) or MyChart.    Jenise Acosta, MS, PA-C  [facility]

## 2018-12-03 ENCOUNTER — OFFICE VISIT (OUTPATIENT)
Dept: SLEEP MEDICINE | Facility: CLINIC | Age: 53
End: 2018-12-03
Attending: PHYSICIAN ASSISTANT
Payer: COMMERCIAL

## 2018-12-03 VITALS
DIASTOLIC BLOOD PRESSURE: 90 MMHG | WEIGHT: 276 LBS | HEIGHT: 67 IN | BODY MASS INDEX: 43.32 KG/M2 | OXYGEN SATURATION: 97 % | RESPIRATION RATE: 16 BRPM | HEART RATE: 78 BPM | SYSTOLIC BLOOD PRESSURE: 152 MMHG

## 2018-12-03 DIAGNOSIS — G47.33 OSA ON CPAP: ICD-10-CM

## 2018-12-03 PROCEDURE — 99243 OFF/OP CNSLTJ NEW/EST LOW 30: CPT | Performed by: PSYCHIATRY & NEUROLOGY

## 2018-12-03 NOTE — PATIENT INSTRUCTIONS

## 2018-12-03 NOTE — MR AVS SNAPSHOT
After Visit Summary   12/3/2018    Richard Ann    MRN: [id number]           Patient Information     Date Of Birth          1965        Visit Information        Provider Department      12/3/2018 2:00 PM Kobi Tracey MD [facility]        Today's Diagnoses     SHANAE on CPAP          Care Instructions      Your BMI is Body mass index is 43.23 kg/(m^2).  Weight management is a personal decision.  If you are interested in exploring weight loss strategies, the following discussion covers the approaches that may be successful. Body mass index (BMI) is one way to tell whether you are at a healthy weight, overweight, or obese. It measures your weight in relation to your height.  A BMI of 18.5 to 24.9 is in the healthy range. A person with a BMI of 25 to 29.9 is considered overweight, and someone with a BMI of 30 or greater is considered obese. More than two-thirds of American adults are considered overweight or obese.  Being overweight or obese increases the risk for further weight gain. Excess weight may lead to heart disease and diabetes.  Creating and following plans for healthy eating and physical activity may help you improve your health.  Weight control is part of healthy lifestyle and includes exercise, emotional health, and healthy eating habits. Careful eating habits lifelong are the mainstay of weight control. Though there are significant health benefits from weight loss, long-term weight loss with diet alone may be very difficult to achieve- studies show long-term success with dietary management in less than 10% of people. Attaining a healthy weight may be especially difficult to achieve in those with severe obesity. In some cases, medications, devices and surgical management might be considered.  What can you do?  If you are overweight or obese and are interested in methods for weight loss, you should discuss this with your provider.     Consider reducing daily  calorie intake by 500 calories.     Keep a food journal.     Avoiding skipping meals, consider cutting portions instead.    Diet combined with exercise helps maintain muscle while optimizing fat loss. Strength training is particularly important for building and maintaining muscle mass. Exercise helps reduce stress, increase energy, and improves fitness. Increasing exercise without diet control, however, may not burn enough calories to loose weight.       Start walking three days a week 10-20 minutes at a time    Work towards walking thirty minutes five days a week     Eventually, increase the speed of your walking for 1-2 minutes at time    In addition, we recommend that you review healthy lifestyles and methods for weight loss available through the National Institutes of Health patient information sites:  http://win.niddk.nih.gov/publications/index.htm    And look into health and wellness programs that may be available through your health insurance provider, employer, local community center, or alfredo club.    Weight management plan: Patient was referred to their PCP to discuss a diet and exercise plan.            Follow-ups after your visit        Follow-up notes from your care team     Return in about 2 years (around 12/3/2020).      Who to contact     If you have questions or need follow up information about today's clinic visit or your schedule please contact [facility] directly at [phone].  Normal or non-critical lab and imaging results will be communicated to you by MyChart, letter or phone within 4 business days after the clinic has received the results. If you do not hear from us within 7 days, please contact the clinic through MyChart or phone. If you have a critical or abnormal lab result, we will notify you by phone as soon as possible.  Submit refill requests through [employer] or call your pharmacy and they will forward the refill request to us. Please allow 3 business days for your  "refill to be completed.          Additional Information About Your Visit        [employer] Information     [employer] gives you secure access to your electronic health record. If you see a primary care provider, you can also send messages to your care team and make appointments. If you have questions, please call your primary care clinic.  If you do not have a primary care provider, please call [phone] and they will assist you.        Care EveryWhere ID     This is your Care EveryWhere ID. This could be used by other organizations to access your [place] medical records  [id number]        Your Vitals Were     Pulse Respirations Height Pulse Oximetry BMI (Body Mass Index)       78 16 1.702 m (5' 7\") 97% 43.23 kg/m2        Blood Pressure from Last 3 Encounters:   12/03/18 152/90   11/19/18 130/88   04/17/18 132/82    Weight from Last 3 Encounters:   12/03/18 125.2 kg (276 lb)   11/19/18 123.8 kg (273 lb)   04/17/18 122.5 kg (270 lb)              We Performed the Following     Comprehensive [facility]     SLEEP EVALUATION & MANAGEMENT REFERRAL - ADULT -[facility]  [phone] (Age 18 and up)        Primary Care Provider Office Phone # Fax #    Jenise Acosta PA-C [phone]       [address] [place] [address]        Equal Access to Services     NAHEED NELSON : Hadii aad ku hadasho Soomaali, waaxda luqadaha, qaybta kaalmada adeegyada, darwin jones. So [facility] [phone].    ATENCIÓN: Si habla español, tiene a martin disposición servicios gratuitos de asistencia lingüística. Gayle al [phone].    We comply with applicable federal civil rights laws and [place] laws. We do not discriminate on the basis of race, color, national origin, age, disability, sex, sexual orientation, or gender identity.            Thank you!     Thank you for choosing [facility]  for your care. Our goal is always to provide you with excellent care. " Hearing back from our patients is one way we can continue to improve our services. Please take a few minutes to complete the written survey that you may receive in the mail after your visit with us. Thank you!             Your Updated Medication List - Protect others around you: Learn how to safely use, store and throw away your medicines at www.disposemymeds.org.          This list is accurate as of 12/3/18 11:59 PM.  Always use your most recent med list.                   Brand Name Dispense Instructions for use Diagnosis    losartan 100 MG tablet    COZAAR    90 tablet    Take 1 tablet (100 mg) by mouth daily    Benign essential hypertension       omeprazole 20 MG DR capsule    priLOSEC    90 capsule    Take 1 capsule (20 mg) by mouth daily    Gastroesophageal reflux disease with esophagitis       spironolactone 50 MG tablet    ALDACTONE    90 tablet    Take 1 tablet (50 mg) by mouth daily    Benign essential hypertension       testosterone cypionate 200 MG/ML injection    DEPOTESTOSTERONE    1 mL    Inject 0.75 mLs (150 mg) into the muscle every 14 days To be administered by clinic RN    Hypogonadism male

## 2018-12-03 NOTE — NURSING NOTE
"Chief Complaint   Patient presents with     Sleep Problem       Initial /90  Pulse 78  Resp 16  Ht 1.702 m (5' 7\")  Wt 125.2 kg (276 lb)  SpO2 97%  BMI 43.23 kg/m2 Estimated body mass index is 43.23 kg/(m^2) as calculated from the following:    Height as of this encounter: 1.702 m (5' 7\").    Weight as of this encounter: 125.2 kg (276 lb).    Medication Reconciliation: complete     ESS 0  Neck 48cm  Laquita Bain        "
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
## 2018-12-11 ENCOUNTER — DOCUMENTATION ONLY (OUTPATIENT)
Dept: SLEEP MEDICINE | Facility: CLINIC | Age: 53
End: 2018-12-11
Payer: COMMERCIAL

## 2018-12-11 DIAGNOSIS — G47.33 OSA (OBSTRUCTIVE SLEEP APNEA): Primary | ICD-10-CM

## 2018-12-11 NOTE — PROGRESS NOTES
Patient was offered choice of vendor and chose On license of [facility].  Patient Richard Ann was set up at [place] on December 11, 2018. Patient received a Resmed AirSense 10 Auto. Pressures were set at 8-20 cm H2O.   Patient s ramp is 5 cm H2O for Off and FLEX/EPR is EPR, 2.  Patient received a Paco Respironics Mask name: DREAMWEAR  Nasal mask size Medium, heated tubing and heated humidifier.  Patient is not enrolled in the STM Program and does not need to meet compliance. Patient has a follow up on patient to schedule with Dr. Tracey.    LUIS MIGUEL BURRIS

## 2018-12-13 ENCOUNTER — TELEPHONE (OUTPATIENT)
Dept: FAMILY MEDICINE | Facility: CLINIC | Age: 53
End: 2018-12-13

## 2019-01-03 ENCOUNTER — VIRTUAL VISIT (OUTPATIENT)
Dept: FAMILY MEDICINE | Facility: CLINIC | Age: 54
End: 2019-01-03
Payer: COMMERCIAL

## 2019-01-03 ENCOUNTER — TELEPHONE (OUTPATIENT)
Dept: FAMILY MEDICINE | Facility: CLINIC | Age: 54
End: 2019-01-03

## 2019-01-03 ENCOUNTER — ALLIED HEALTH/NURSE VISIT (OUTPATIENT)
Dept: NURSING | Facility: CLINIC | Age: 54
End: 2019-01-03
Payer: COMMERCIAL

## 2019-01-03 DIAGNOSIS — E29.1 HYPOGONADISM MALE: Primary | ICD-10-CM

## 2019-01-03 DIAGNOSIS — Z71.84 TRAVEL ADVICE ENCOUNTER: ICD-10-CM

## 2019-01-03 DIAGNOSIS — E66.01 MORBID OBESITY (H): ICD-10-CM

## 2019-01-03 DIAGNOSIS — Z29.89 ALTITUDE SICKNESS PREVENTATIVE MEASURES: Primary | ICD-10-CM

## 2019-01-03 DIAGNOSIS — I10 BENIGN ESSENTIAL HYPERTENSION: ICD-10-CM

## 2019-01-03 PROCEDURE — 96372 THER/PROPH/DIAG INJ SC/IM: CPT

## 2019-01-03 PROCEDURE — 99441 ZZC PHYSICIAN TELEPHONE EVALUATION 5-10 MIN: CPT | Performed by: PHYSICIAN ASSISTANT

## 2019-01-03 RX ORDER — ACETAZOLAMIDE 250 MG/1
250 TABLET ORAL 2 TIMES DAILY
Qty: 30 TABLET | Refills: 0 | Status: CANCELLED | OUTPATIENT
Start: 2019-01-03

## 2019-01-03 RX ORDER — TESTOSTERONE CYPIONATE 200 MG/ML
150 INJECTION, SOLUTION INTRAMUSCULAR
Status: DISCONTINUED | OUTPATIENT
Start: 2019-01-17 | End: 2019-01-03

## 2019-01-03 RX ORDER — ACETAZOLAMIDE 250 MG/1
250 TABLET ORAL 2 TIMES DAILY
Qty: 30 TABLET | Refills: 1 | Status: SHIPPED | OUTPATIENT
Start: 2019-01-03 | End: 2019-06-06

## 2019-01-03 NOTE — TELEPHONE ENCOUNTER
Called patient @ [phone]    Advised of LP, PA-C message below - patient stated if LP, PA-C has not prescribed this for him then it was the other provider he has seen at this clinic.   Patient has seen MD CHARLES - LOV was 09/22/2017. Advised patient MD CHARLES cannot prescribe this as he has not seen patient for over a year.   Patient stated an understanding and agreed with plan.    Telephone Visit scheduled for 01/03/2019    Marilyn Chacon RN  [facility]

## 2019-01-03 NOTE — PROGRESS NOTES
Please sign new testosterone order so it can be added to the MAR.    Order pending.    JOSELYN Priest, RN, N  [facility]) [phone]

## 2019-01-03 NOTE — TELEPHONE ENCOUNTER
Pt requesting altitude sickness tablets for upcoming 14 day trip to [place]. Skiing, etc.    [facility] pharmacy.    Routing to PCP for further review/recommendations/orders.  Staci Maharaj RN  [facility]

## 2019-01-03 NOTE — PROGRESS NOTES
"  SUBJECTIVE:                                                    Richard Ann is a 53 year old male who is being evaluated via a telephone visit.      The patient has been notified of following:     \"This telephone visit will be conducted via a call between you and your physician/provider. We have found that certain health care needs can be provided without the need for a physical exam.  This service lets us provide the care you need with a short phone conversation.  If a prescription is necessary we can send it directly to your pharmacy.  If lab work is needed we can place an order for that and you can then stop by our lab to have the test done at a later time.    We will bill your insurance company for this service.  Please check with your medical insurance if this type of visit is covered. You may be responsible for the cost of this type of visit if insurance coverage is denied.  The typical cost is $30 (10min), $59 (11-20min) and $85 (21-30min).  Most often these visits are shorter than 10 minutes.    If during the course of the call the physician/provider feels a telephone visit is not appropriate, you will not be charged for this service.\"       Consent has been obtained for this service by care team member: yes.   See the scanned image in the medical record.    Richard Ann complains of  Medication Request      I have reviewed and updated the patient's Past Medical History, Social History, Family History and Medication List.    ALLERGIES  Patient has no known allergies.    Amelia Reagan MA    Additional provider notes: Patient is requesting an Altitude Medication, Travels frequently for work. Has used Diamox in the past and has worked well for the patient.  The patient reports that he travels frequently for work and that he is traveling to [place] where he has previously experienced altitude sickness that he attributes to his high blood pressure medication.  He was hospitalized on 6/3/2017 for " altitude sickness and is trying to prevent future recurrences as he feels as though he is sensitive to this.  He has not had a prescription for Diamox for quite some time but states that when he is used in the past it has worked well and he denies any adverse reactions.  He does not have any known liver disease.    Assessment/Plan:  Richard was seen today for medication request.    Diagnoses and all orders for this visit:    Altitude sickness preventative measures  Sent over prescription for Diamox for patient to initiate twice daily 1-2 days prior to travel and continue for 48 hours after returning from that area.  Patient voiced understanding and agreement.  If he develops symptoms of altitude sickness to seek urgent evaluation.  -     acetaZOLAMIDE (DIAMOX) 250 MG tablet; Take 1 tablet (250 mg) by mouth 2 times daily Start 1- 2 days prior to travel to areas of high altitude and continue for 48 hours after returning from the area of increased elevation    Morbid obesity (H)  Benign essential hypertension  Last blood pressure reading was elevated when he was at the sleep apnea clinic.  Requested that he have his blood pressure rechecked when he picks up his prescription of Diamox.  Patient voiced understanding and agreement he is continuing his daily losartan blood pressure medication.        I have reviewed the note as documented above.  This accurately captures the substance of my conversation with the patient, Richard Ann.      Jenise Acosta, MS, PA-C      Total time of call between patient and provider was 5 minutes

## 2019-01-03 NOTE — TELEPHONE ENCOUNTER
Per the 10/17/2018 note we have never filled before.  He can do a phone visit or e-visit for this RX.  We did not discuss at annual PX.      Jenise Acosta MS, PA-C

## 2019-01-03 NOTE — PATIENT INSTRUCTIONS
Wasted .25ml and had JAHAIRA, RN sign off before wasting.  Staci aMharaj, RN  [place] [facility]

## 2019-01-10 DIAGNOSIS — E29.1 HYPOGONADISM MALE: Primary | ICD-10-CM

## 2019-01-10 RX ORDER — TESTOSTERONE CYPIONATE 200 MG/ML
150 INJECTION, SOLUTION INTRAMUSCULAR
Status: ACTIVE | OUTPATIENT
Start: 2019-01-17

## 2019-01-10 NOTE — PROGRESS NOTES
Please sign pending testosterone order so that it can be added to the MAR.    Thank you,    JOSELYN Priest, RN, N  [facility]) [phone]

## 2019-01-17 ENCOUNTER — ALLIED HEALTH/NURSE VISIT (OUTPATIENT)
Dept: NURSING | Facility: CLINIC | Age: 54
End: 2019-01-17
Payer: COMMERCIAL

## 2019-01-17 VITALS — DIASTOLIC BLOOD PRESSURE: 84 MMHG | SYSTOLIC BLOOD PRESSURE: 126 MMHG

## 2019-01-17 DIAGNOSIS — E34.9 TESTOSTERONE DEFICIENCY: Primary | ICD-10-CM

## 2019-01-17 PROCEDURE — 96372 THER/PROPH/DIAG INJ SC/IM: CPT

## 2019-01-17 RX ADMIN — TESTOSTERONE CYPIONATE 150 MG: 200 INJECTION, SOLUTION INTRAMUSCULAR at 15:15

## 2019-02-01 ENCOUNTER — ALLIED HEALTH/NURSE VISIT (OUTPATIENT)
Dept: NURSING | Facility: CLINIC | Age: 54
End: 2019-02-01
Payer: COMMERCIAL

## 2019-02-01 DIAGNOSIS — E29.1 HYPOGONADISM MALE: Primary | ICD-10-CM

## 2019-02-01 PROCEDURE — 96372 THER/PROPH/DIAG INJ SC/IM: CPT

## 2019-02-01 RX ADMIN — TESTOSTERONE CYPIONATE 150 MG: 200 INJECTION, SOLUTION INTRAMUSCULAR at 13:47

## 2019-02-15 ENCOUNTER — ALLIED HEALTH/NURSE VISIT (OUTPATIENT)
Dept: NURSING | Facility: CLINIC | Age: 54
End: 2019-02-15
Payer: COMMERCIAL

## 2019-02-15 DIAGNOSIS — E29.1 HYPOGONADISM MALE: Primary | ICD-10-CM

## 2019-02-15 PROCEDURE — 96372 THER/PROPH/DIAG INJ SC/IM: CPT

## 2019-02-15 RX ADMIN — TESTOSTERONE CYPIONATE 150 MG: 200 INJECTION, SOLUTION INTRAMUSCULAR at 10:59

## 2019-03-25 DIAGNOSIS — E29.1 HYPOGONADISM MALE: ICD-10-CM

## 2019-03-25 NOTE — TELEPHONE ENCOUNTER
testosterone cypionate (DEPOTESTOSTERONE) 200 MG/ML injection        Last Written Prescription Date:  1.17.19  Last Fill Quantity: 150 MG,   # refills: EVERY 14 DAYS  Last Office Visit: 11.19.18      Future Office visit:    Next 5 appointments (look out 90 days)    Mar 26, 2019  3:00 PM CDT  [facility] with [facility] [address] [place] [phone]           Routing refill request to provider for review/approval because:  Drug not on the [facility], [facility] or [facility] refill protocol or controlled substance

## 2019-03-25 NOTE — TELEPHONE ENCOUNTER
Routing refill request to provider for review/approval because:  Drug not on the [facility] refill protocol       JOSELYN Priest, RN, N  [facility] [phone]

## 2019-03-26 ENCOUNTER — ALLIED HEALTH/NURSE VISIT (OUTPATIENT)
Dept: NURSING | Facility: CLINIC | Age: 54
End: 2019-03-26
Payer: COMMERCIAL

## 2019-03-26 DIAGNOSIS — E29.1 HYPOGONADISM MALE: Primary | ICD-10-CM

## 2019-03-26 PROCEDURE — 96372 THER/PROPH/DIAG INJ SC/IM: CPT

## 2019-03-26 RX ORDER — TESTOSTERONE CYPIONATE 200 MG/ML
150 INJECTION, SOLUTION INTRAMUSCULAR
Qty: 1 ML | Refills: 5 | Status: SHIPPED | OUTPATIENT
Start: 2019-03-26 | End: 2021-01-25

## 2019-03-26 RX ADMIN — TESTOSTERONE CYPIONATE 150 MG: 200 INJECTION, SOLUTION INTRAMUSCULAR at 15:14

## 2019-03-26 NOTE — PROGRESS NOTES
Prior to injection, verified patient identity using patient's name and date of birth.  Due to injection administration, patient instructed to remain in clinic for 15 minutes  afterwards, and to report any adverse reaction to me immediately.      Testosterone is in tamper evident bag with sales receipt from today: Yes     Check vial for refills remaining and initiate refill request if no refills remain.     Administered testosterone medication in clinic.  Medication was paid for at pharmacy.        Drug Amount Wasted:  Yes: 50 mg/ml   Vial/Syringe: Single dose vial  Expiration Date:  12/2020      JOSELYN Priest, RN, N  [facility] [phone]

## 2019-04-09 ENCOUNTER — ALLIED HEALTH/NURSE VISIT (OUTPATIENT)
Dept: NURSING | Facility: CLINIC | Age: 54
End: 2019-04-09
Payer: COMMERCIAL

## 2019-04-09 DIAGNOSIS — E34.9 TESTOSTERONE DEFICIENCY: ICD-10-CM

## 2019-04-09 DIAGNOSIS — E29.1 HYPOGONADISM MALE: Primary | ICD-10-CM

## 2019-04-09 DIAGNOSIS — R79.89 LOW TESTOSTERONE IN MALE: ICD-10-CM

## 2019-04-09 PROCEDURE — 96372 THER/PROPH/DIAG INJ SC/IM: CPT

## 2019-04-09 RX ADMIN — TESTOSTERONE CYPIONATE 150 MG: 200 INJECTION, SOLUTION INTRAMUSCULAR at 15:17

## 2019-04-09 NOTE — PROGRESS NOTES
Prior to injection, verified patient identity using patient's name and date of birth.  Due to injection administration, patient instructed to remain in clinic for 15 minutes  afterwards, and to report any adverse reaction to me immediately.      Testosterone is in tamper evident bag with sales receipt from today: Yes     Check vial for refills remaining and initiate refill request if no refills remain.     Administered testosterone medication in clinic.  Medication was paid for at pharmacy.        Drug Amount Wasted:  Yes: 50 mg/ml   Vial/Syringe: Single dose vial  Expiration Date:  02/2021    Tiffanie Madrid RN, BSN  [facility]

## 2019-04-13 ENCOUNTER — OFFICE VISIT (OUTPATIENT)
Dept: FAMILY MEDICINE | Facility: CLINIC | Age: 54
End: 2019-04-13
Payer: COMMERCIAL

## 2019-04-13 VITALS
BODY MASS INDEX: 43.95 KG/M2 | SYSTOLIC BLOOD PRESSURE: 134 MMHG | WEIGHT: 280 LBS | DIASTOLIC BLOOD PRESSURE: 82 MMHG | TEMPERATURE: 98.2 F | HEART RATE: 94 BPM | OXYGEN SATURATION: 97 % | HEIGHT: 67 IN

## 2019-04-13 DIAGNOSIS — L65.9 HAIR LOSS: ICD-10-CM

## 2019-04-13 DIAGNOSIS — L21.9 SEBORRHEIC DERMATITIS OF SCALP: Primary | ICD-10-CM

## 2019-04-13 LAB
FERRITIN SERPL-MCNC: 423 NG/ML (ref 26–388)
PSA SERPL-ACNC: 1.84 UG/L (ref 0–4)
TSH SERPL DL<=0.005 MIU/L-ACNC: 1.28 MU/L (ref 0.4–4)

## 2019-04-13 PROCEDURE — 36415 COLL VENOUS BLD VENIPUNCTURE: CPT | Performed by: PHYSICIAN ASSISTANT

## 2019-04-13 PROCEDURE — G0103 PSA SCREENING: HCPCS | Performed by: PHYSICIAN ASSISTANT

## 2019-04-13 PROCEDURE — 82728 ASSAY OF FERRITIN: CPT | Performed by: PHYSICIAN ASSISTANT

## 2019-04-13 PROCEDURE — 99214 OFFICE O/P EST MOD 30 MIN: CPT | Performed by: PHYSICIAN ASSISTANT

## 2019-04-13 PROCEDURE — 84443 ASSAY THYROID STIM HORMONE: CPT | Performed by: PHYSICIAN ASSISTANT

## 2019-04-13 RX ORDER — FINASTERIDE 1 MG/1
1 TABLET, FILM COATED ORAL DAILY
Qty: 90 TABLET | Refills: 1 | Status: SHIPPED | OUTPATIENT
Start: 2019-04-13 | End: 2020-06-18

## 2019-04-13 RX ORDER — KETOCONAZOLE 20 MG/ML
SHAMPOO TOPICAL DAILY PRN
Qty: 100 ML | Refills: 1 | Status: SHIPPED | OUTPATIENT
Start: 2019-04-13 | End: 2020-06-18

## 2019-04-13 ASSESSMENT — MIFFLIN-ST. JEOR: SCORE: 2073.7

## 2019-04-13 NOTE — PROGRESS NOTES
"  SUBJECTIVE:   Richard Ann is a 53 year old male who presents to clinic today for the following   health issues:      Concern - Hair Loss  Onset: couple years - requesting to start propecia    Description:   Does have some hair growth on his head - was shaving in past     Intensity: mild    Progression of Symptoms:  same    Accompanying Signs & Symptoms:  nothing    Previous history of similar problem:   nothing    Precipitating factors:   Worsened by: n/a    Alleviating factors:  Improved by: n/a    Therapies Tried and outcome: Rogaine - no help    Patient reports that for the last 2-3 years he has noticed significant hair loss near his frontal scalp.  He associates this hair loss with this stressful time that surrounded his divorce.  Since that time he has been shaving his head due to the presence of hair loss but now that his stress level is quite a bit lower than it has been historically he has been trying to grow his hair out and notices that the hair is much thinner than it has been historically and somewhat \"patchy\".  He has tried Rogaine for the last 2-3 months with minimal if any improvement of his symptoms and is inquiring about Propecia.    Additional history: as documented    Reviewed  and updated as needed this visit by clinical staff  Tobacco  Allergies  Meds  Problems  Med Hx  Surg Hx  Fam Hx         Reviewed and updated as needed this visit by Provider  Tobacco  Allergies  Meds  Problems  Med Hx  Surg Hx  Fam Hx         Patient Active Problem List   Diagnosis     Family history of colon cancer in father     Hypogonadism male     Benign essential hypertension     Situational anxiety     Morbid obesity (H)     SHANAE (obstructive sleep apnea)     Past Surgical History:   Procedure Laterality Date     COLONOSCOPY  2017    normal - repeat q5 d/t family history       Social History     Tobacco Use     Smoking status: Never Smoker     Smokeless tobacco: Never Used   Substance Use Topics     " "Alcohol use: Yes     Alcohol/week: 2.4 oz     Types: 4 Standard drinks or equivalent per week     Comment: 4 drinks per week     Family History   Problem Relation Age of Onset     Colon Cancer Father 72         one year after diagnosis         Current Outpatient Medications   Medication Sig Dispense Refill     finasteride (PROPECIA) 1 MG tablet Take 1 tablet (1 mg) by mouth daily 90 tablet 1     ketoconazole (NIZORAL) 2 % external shampoo Apply topically daily as needed for itching or irritation 100 mL 1     losartan (COZAAR) 100 MG tablet Take 1 tablet (100 mg) by mouth daily 90 tablet 1     omeprazole (PRILOSEC) 20 MG CR capsule Take 1 capsule (20 mg) by mouth daily 90 capsule 3     spironolactone (ALDACTONE) 50 MG tablet Take 1 tablet (50 mg) by mouth daily 90 tablet 1     testosterone cypionate (DEPOTESTOSTERONE) 200 MG/ML injection Inject 0.75 mLs (150 mg) into the muscle every 14 days To be administered by clinic RN 1 mL 5     acetaZOLAMIDE (DIAMOX) 250 MG tablet Take 1 tablet (250 mg) by mouth 2 times daily Start 1- 2 days prior to travel to areas of high altitude and continue for 48 hours after returning from the area of increased elevation 30 tablet 1     No Known Allergies    ROS:  Constitutional, HEENT, cardiovascular, pulmonary, GI, , musculoskeletal, neuro, skin, endocrine and psych systems are negative, except as otherwise noted.    OBJECTIVE:     /82 (BP Location: Right arm, Patient Position: Chair, Cuff Size: Adult Large)   Pulse 94   Temp 98.2  F (36.8  C) (Oral)   Ht 1.702 m (5' 7\")   Wt 127 kg (280 lb)   SpO2 97%   BMI 43.85 kg/m    Body mass index is 43.85 kg/m .  GENERAL: healthy, alert and no distress  EYES: Eyes grossly normal to inspection, PERRL and conjunctivae and sclerae normal  RESP: lungs clear to auscultation - no rales, rhonchi or wheezes  CV: regular rate and rhythm, normal S1 S2, no S3 or S4, no murmur, click or rub, no peripheral edema and peripheral pulses " strong  MS: no gross musculoskeletal defects noted, no edema  SKIN: Scaly/dry scalp with areas of excoriations and patches of seborrhea.  Noted at the central mental scalp, albeit, sparse.  Male pattern baldness evident at bilateral temples.  NEURO: Normal strength and tone, mentation intact and speech normal  PSYCH: mentation appears normal, affect normal/bright    Diagnostic Test Results:  none     ASSESSMENT/PLAN:         Richard was seen today for hair loss.    Diagnoses and all orders for this visit:    Seborrheic dermatitis of scalp  Evidence of untreated seborrheic dermatitis.  Treatment with ketoconazole shampoo once daily as needed for at least 2 weeks  -     ketoconazole (NIZORAL) 2 % external shampoo; Apply topically daily as needed for itching or irritation    Hair loss  Likely multifactorial.  Will check thyroid function, iron stores, PSA for baseline.  Trial of finasteride once daily.  Recheck PSA in 6 months.  -     finasteride (PROPECIA) 1 MG tablet; Take 1 tablet (1 mg) by mouth daily  -     PSA, screen  -     TSH with free T4 reflex  -     Ferritin    Return in about 6 months (around 10/13/2019) for Physical Exam, Lab Work.      Jenise Acosta PA-C  [facility]

## 2019-04-15 NOTE — RESULT ENCOUNTER NOTE
Richard  I have reviewed your recent labs. Here are the results:    -PSA (prostate specific antigen) test is normal.  This indicates a low likelihood of prostate cancer.  ADVISE: rechecking this in 6 months due to the start of your finasteride.  -TSH (thyroid stimulating hormone) level is normal which indicates normal thyroid function.  -Ferritin (iron) level is high-normal, this is not to a concerning level. This is not associated with hair loss.  We will recheck in 6 months at your annual visit.      If you have any questions please do not hesitate to contact our office via phone ([phone]) or MyChart.    Jenise Acosta, MS, PA-C  [facility]

## 2019-06-06 ENCOUNTER — OFFICE VISIT (OUTPATIENT)
Dept: FAMILY MEDICINE | Facility: CLINIC | Age: 54
End: 2019-06-06
Payer: COMMERCIAL

## 2019-06-06 VITALS
WEIGHT: 275 LBS | OXYGEN SATURATION: 98 % | BODY MASS INDEX: 43.16 KG/M2 | HEIGHT: 67 IN | SYSTOLIC BLOOD PRESSURE: 130 MMHG | TEMPERATURE: 98.9 F | HEART RATE: 110 BPM | DIASTOLIC BLOOD PRESSURE: 80 MMHG

## 2019-06-06 DIAGNOSIS — R19.7 DIARRHEA, UNSPECIFIED TYPE: ICD-10-CM

## 2019-06-06 DIAGNOSIS — Z29.89 ALTITUDE SICKNESS PREVENTATIVE MEASURES: ICD-10-CM

## 2019-06-06 DIAGNOSIS — R11.2 NAUSEA AND VOMITING, INTRACTABILITY OF VOMITING NOT SPECIFIED, UNSPECIFIED VOMITING TYPE: Primary | ICD-10-CM

## 2019-06-06 LAB
BASOPHILS # BLD AUTO: 0 10E9/L (ref 0–0.2)
BASOPHILS NFR BLD AUTO: 0.1 %
DIFFERENTIAL METHOD BLD: NORMAL
EOSINOPHIL # BLD AUTO: 0.1 10E9/L (ref 0–0.7)
EOSINOPHIL NFR BLD AUTO: 1.9 %
ERYTHROCYTE [DISTWIDTH] IN BLOOD BY AUTOMATED COUNT: 13.9 % (ref 10–15)
HCT VFR BLD AUTO: 43.3 % (ref 40–53)
HGB BLD-MCNC: 15.1 G/DL (ref 13.3–17.7)
LIPASE SERPL-CCNC: 72 U/L (ref 73–393)
LYMPHOCYTES # BLD AUTO: 1.2 10E9/L (ref 0.8–5.3)
LYMPHOCYTES NFR BLD AUTO: 16 %
MCH RBC QN AUTO: 28.5 PG (ref 26.5–33)
MCHC RBC AUTO-ENTMCNC: 34.9 G/DL (ref 31.5–36.5)
MCV RBC AUTO: 82 FL (ref 78–100)
MONOCYTES # BLD AUTO: 0.9 10E9/L (ref 0–1.3)
MONOCYTES NFR BLD AUTO: 12.3 %
NEUTROPHILS # BLD AUTO: 5.2 10E9/L (ref 1.6–8.3)
NEUTROPHILS NFR BLD AUTO: 69.7 %
PLATELET # BLD AUTO: 150 10E9/L (ref 150–450)
RBC # BLD AUTO: 5.29 10E12/L (ref 4.4–5.9)
WBC # BLD AUTO: 7.5 10E9/L (ref 4–11)

## 2019-06-06 PROCEDURE — 83690 ASSAY OF LIPASE: CPT | Performed by: FAMILY MEDICINE

## 2019-06-06 PROCEDURE — 36415 COLL VENOUS BLD VENIPUNCTURE: CPT | Performed by: FAMILY MEDICINE

## 2019-06-06 PROCEDURE — 80053 COMPREHEN METABOLIC PANEL: CPT | Performed by: FAMILY MEDICINE

## 2019-06-06 PROCEDURE — 99214 OFFICE O/P EST MOD 30 MIN: CPT | Performed by: FAMILY MEDICINE

## 2019-06-06 PROCEDURE — 85025 COMPLETE CBC W/AUTO DIFF WBC: CPT | Performed by: FAMILY MEDICINE

## 2019-06-06 RX ORDER — ONDANSETRON 4 MG/1
8 TABLET, ORALLY DISINTEGRATING ORAL ONCE
Status: COMPLETED | OUTPATIENT
Start: 2019-06-06 | End: 2019-06-06

## 2019-06-06 RX ORDER — ACETAZOLAMIDE 250 MG/1
250 TABLET ORAL 2 TIMES DAILY
Qty: 30 TABLET | Refills: 1 | Status: SHIPPED | OUTPATIENT
Start: 2019-06-06 | End: 2021-01-25

## 2019-06-06 RX ORDER — LOPERAMIDE HYDROCHLORIDE 2 MG/1
2 TABLET ORAL 4 TIMES DAILY PRN
COMMUNITY
Start: 2019-06-06 | End: 2020-06-18

## 2019-06-06 RX ORDER — ONDANSETRON 4 MG/1
4-8 TABLET, ORALLY DISINTEGRATING ORAL EVERY 8 HOURS PRN
Qty: 20 TABLET | Refills: 1 | Status: SHIPPED | OUTPATIENT
Start: 2019-06-06 | End: 2021-01-25

## 2019-06-06 RX ADMIN — ONDANSETRON 8 MG: 4 TABLET, ORALLY DISINTEGRATING ORAL at 13:15

## 2019-06-06 ASSESSMENT — MIFFLIN-ST. JEOR: SCORE: 2051.02

## 2019-06-06 NOTE — PROGRESS NOTES
"Subjective     Richard Ann is a 53 year old male who presents to clinic today for the following health issues:    HPI   Acute Illness   Acute illness concerns: Vomiting & diarrhea / started Monday night about 2 1/2 hours after eating  Onset: x 4 days    Fever: no    Chills/Sweats: no    Headache (location?): YES    Sinus Pressure:no    Conjunctivitis:  no    Ear Pain: no    Rhinorrhea: no    Congestion: no    Sore Throat: no  No history of diverticulitis    Cough: no    Wheeze: no    Decreased Appetite: YES -- trying to stay hydrated with water.     Nausea: YES    Vomiting: YES    Diarrhea:  YES x 12 today, symptom started first.     Dysuria/Freq.: no    Fatigue/Achiness: YES    Sick/Strep Exposure: YES- Food, had eaten possibly contaminated food. Felt slow recovery two days ago, however became worse today.  No other exposures.     Therapies Tried and outcome: Tylenol, but threw up all medication      Reviewed and updated as needed this visit by provider:  Tobacco  Allergies  Meds  Problems  Med Hx  Surg Hx  Fam Hx         Review of Systems   Constitutional, HEENT, cardiovascular, pulmonary, GI, , musculoskeletal, neuro, skin, endocrine and psych systems are negative, except as otherwise noted.  This document serves as a record of the services and decisions personally performed and made by Richard Johnson MD. It was created on his behalf by Jaya Thomas, a trained medical scribe. The creation of this document is based the provider's statements to the medical scribe.  Scribe Jaya Thomas 1:58 PM, June 6, 2019     Objective   /80   Pulse 110   Temp 98.9  F (37.2  C) (Oral)   Ht 1.702 m (5' 7\")   Wt 124.7 kg (275 lb)   SpO2 98%   BMI 43.07 kg/m   Body mass index is 43.07 kg/m .  Physical Exam   GENERAL: healthy, alert, well nourished, well hydrated, no distress  HENT: ear canals- normal; TMs- normal; Nose- normal; Mouth- no ulcers, no lesions  NECK: no tenderness, no adenopathy, no asymmetry, no masses, no " stiffness; thyroid- normal to palpation  RESP: lungs clear to auscultation - no rales, no rhonchi, no wheezes  CV: regular rates and rhythm, normal S1 S2, no S3 or S4 and no murmur, no click or rub -  ABDOMEN: soft, RUQ mild tenderness, no  hepatosplenomegaly, no masses, normal bowel sounds  MS: extremities- no gross deformities noted, no edema  SKIN: no suspicious lesions, no rashes  BACK: no CVA tenderness, no paralumbar tenderness    Results for orders placed or performed in visit on 06/06/19 (from the past 24 hour(s))   CBC with platelets and differential   Result Value Ref Range    WBC 7.5 4.0 - 11.0 10e9/L    RBC Count 5.29 4.4 - 5.9 10e12/L    Hemoglobin 15.1 13.3 - 17.7 g/dL    Hematocrit 43.3 40.0 - 53.0 %    MCV 82 78 - 100 fl    MCH 28.5 26.5 - 33.0 pg    MCHC 34.9 31.5 - 36.5 g/dL    RDW 13.9 10.0 - 15.0 %    Platelet Count 150 150 - 450 10e9/L    % Neutrophils 69.7 %    % Lymphocytes 16.0 %    % Monocytes 12.3 %    % Eosinophils 1.9 %    % Basophils 0.1 %    Absolute Neutrophil 5.2 1.6 - 8.3 10e9/L    Absolute Lymphocytes 1.2 0.8 - 5.3 10e9/L    Absolute Monocytes 0.9 0.0 - 1.3 10e9/L    Absolute Eosinophils 0.1 0.0 - 0.7 10e9/L    Absolute Basophils 0.0 0.0 - 0.2 10e9/L    Diff Method Automated Method    Lipase   Result Value Ref Range    Lipase 72 (L) 73 - 393 U/L     Assessment & Plan   Richard was seen today for diarrhea.    Diagnoses and all orders for this visit:    Nausea and vomiting, intractability of vomiting not specified, unspecified vomiting type - Receptive to Zofran trial in clinic, oral rehydration solution recommended and prescribed.   -     Comprehensive metabolic panel (BMP + Alb, Alk Phos, ALT, AST, Total. Bili, TP)  -     Lipase  -     ondansetron (ZOFRAN-ODT) ODT tab 8 mg  -     ondansetron (ZOFRAN-ODT) 4 MG ODT tab; Take 1-2 tablets (4-8 mg) by mouth every 8 hours as needed for nausea    Diarrhea, unspecified type -  - Uncertain etiology - most likely viral gastroenteritis, less  "likely food poisoning.  Mild RUQ cause could be gallbladder related.  labs pended, stool kit given. Symptomatic cares discussed. See above. Begin:   -     CBC with platelets and differential  -     Comprehensive metabolic panel (BMP + Alb, Alk Phos, ALT, AST, Total. Bili, TP)  -     Lipase  -     Enteric Bacteria and Virus Panel by GISELLE Stool; Future  -     Giardia antigen; Future  -     Ova and Parasite Exam Routine; Future  -     Clostridium difficile toxin B PCR; Future  -     loperamide (IMODIUM A-D) 2 MG tablet; Take 1 tablet (2 mg) by mouth 4 times daily as needed for diarrhea    Altitude sickness preventative measures with travel - [place] etc.- Situational, refilled, continue prn.  -     acetaZOLAMIDE (DIAMOX) 250 MG tablet; Take 1 tablet (250 mg) by mouth 2 times daily Start 1- 2 days prior to travel to areas of high altitude and continue for 48 hours after returning from the area of increased elevation     BMI:   Estimated body mass index is 43.85 kg/m  as calculated from the following:    Height as of 4/13/19: 1.702 m (5' 7\").    Weight as of 4/13/19: 127 kg (280 lb).   Weight management plan: Discussed healthy diet and exercise guidelines    See Patient Instructions    Return in about 2 days (around 6/8/2019), or if symptoms worsen or fail to improve, for recheck.     The information in this document, created by the medical scribe for me, accurately reflects the services I personally performed and the decisions made by me. I have reviewed and approved this document for accuracy prior to leaving the patient care area.  2:04 PM, 06/06/19        Jose Johnson MD   Pager - [phone]  [facility]    "

## 2019-06-07 LAB
ALBUMIN SERPL-MCNC: 4.4 G/DL (ref 3.4–5)
ALP SERPL-CCNC: 75 U/L (ref 40–150)
ALT SERPL W P-5'-P-CCNC: 46 U/L (ref 0–70)
ANION GAP SERPL CALCULATED.3IONS-SCNC: 7 MMOL/L (ref 3–14)
AST SERPL W P-5'-P-CCNC: 18 U/L (ref 0–45)
BILIRUB SERPL-MCNC: 1.5 MG/DL (ref 0.2–1.3)
BUN SERPL-MCNC: 18 MG/DL (ref 7–30)
CALCIUM SERPL-MCNC: 8.8 MG/DL (ref 8.5–10.1)
CHLORIDE SERPL-SCNC: 106 MMOL/L (ref 94–109)
CO2 SERPL-SCNC: 26 MMOL/L (ref 20–32)
CREAT SERPL-MCNC: 1.54 MG/DL (ref 0.66–1.25)
GFR SERPL CREATININE-BSD FRML MDRD: 50 ML/MIN/{1.73_M2}
GLUCOSE SERPL-MCNC: 153 MG/DL (ref 70–99)
POTASSIUM SERPL-SCNC: 4.3 MMOL/L (ref 3.4–5.3)
PROT SERPL-MCNC: 7.7 G/DL (ref 6.8–8.8)
SODIUM SERPL-SCNC: 139 MMOL/L (ref 133–144)

## 2019-06-07 NOTE — RESULT ENCOUNTER NOTE
Dear Richard,    Here is a summary of your recent test results:  -Normal red blood cell (hgb) levels, normal white blood cell count and normal platelet levels.  -Lipase (pancreas test) is essentially normal.      For additional lab test information, labtestsonline.org is an excellent reference.             Thank you very much for trusting me and [facility].     Healthy regards,  Jose Johnson MD

## 2019-06-07 NOTE — RESULT ENCOUNTER NOTE
Please call to see how his nausea,vomitting and diarrhea is doing today and inform him of these results  -Liver and gallbladder tests (ALT,AST, Alk phos,bilirubin) are normal.  -Kidney function (GFR) is decreased.  ADVISE: rechecking this in 1-2 weeks with a lab only appointment.   -Sodium is normal.  -Potassium is normal.  -Calcium is normal.  -Glucose is mildly elevated but you were nonfasting and this is okay.

## 2019-06-14 DIAGNOSIS — N28.9 FUNCTION KIDNEY DECREASED: Primary | ICD-10-CM

## 2019-12-06 DIAGNOSIS — I10 BENIGN ESSENTIAL HYPERTENSION: ICD-10-CM

## 2019-12-06 NOTE — TELEPHONE ENCOUNTER
"Requested Prescriptions   Pending Prescriptions Disp Refills     spironolactone (ALDACTONE) 50 MG tablet [Pharmacy Med Name: SPIRONOLACTONE 50MG TABS]          Last Written Prescription Date:  6.20.19  Last Fill Quantity: 90 tablet,  # refills: 1   Last office visit: 6/6/2019 with prescribing provider: LAMONT Dexter           Future Office Visit:       90 tablet 1     Sig: TAKE ONE TABLET BY MOUTH DAILY       Diuretics (Including Combos) Protocol Failed - 12/6/2019  3:19 PM        Failed - Normal serum creatinine on file in past 12 months     Recent Labs   Lab Test 06/06/19  1411   CR 1.54*              Passed - Blood pressure under 140/90 in past 12 months     BP Readings from Last 3 Encounters:   06/06/19 130/80   04/13/19 134/82   01/17/19 126/84                 Passed - Recent (12 mo) or future (30 days) visit within the authorizing provider's specialty     Patient has had an office visit with the authorizing provider or a provider within the authorizing providers department within the previous 12 mos or has a future within next 30 days. See \"Patient Info\" tab in inbasket, or \"Choose Columns\" in Meds & Orders section of the refill encounter.              Passed - Medication is active on med list        Passed - Patient is age 18 or older        Passed - Normal serum potassium on file in past 12 months     Recent Labs   Lab Test 06/06/19  1411   POTASSIUM 4.3                    Passed - Normal serum sodium on file in past 12 months     Recent Labs   Lab Test 06/06/19  1411                 losartan (COZAAR) 100 MG tablet [Pharmacy Med Name: LOSARTAN POTASSIUM 100MG TABS] 90 tablet 1     Sig: TAKE ONE TABLET BY MOUTH DAILY       Angiotensin-II Receptors Failed - 12/6/2019  3:19 PM        Failed - Normal serum creatinine on file in past 12 months     Recent Labs   Lab Test 06/06/19  1411   CR 1.54*             Passed - Last blood pressure under 140/90 in past 12 months     BP Readings from Last 3 " "Encounters:   06/06/19 130/80   04/13/19 134/82   01/17/19 126/84                 Passed - Recent (12 mo) or future (30 days) visit within the authorizing provider's specialty     Patient has had an office visit with the authorizing provider or a provider within the authorizing providers department within the previous 12 mos or has a future within next 30 days. See \"Patient Info\" tab in inbasket, or \"Choose Columns\" in Meds & Orders section of the refill encounter.              Passed - Medication is active on med list        Passed - Patient is age 18 or older        Passed - Normal serum potassium on file in past 12 months     Recent Labs   Lab Test 06/06/19  1411   POTASSIUM 4.3                    "

## 2019-12-09 NOTE — TELEPHONE ENCOUNTER
Patient due for CMP (lab only) - order already placed    Attempt #1  [employer] Message sent    Marilyn Chacon RN  [facility]

## 2019-12-10 NOTE — TELEPHONE ENCOUNTER
Patient has read [employer] message.   Waiting for lab only to be scheudled.    JOSELYN Priest, RN, PHN  [facility]  Office: [phone]  Fax: [phone]

## 2019-12-12 DIAGNOSIS — N28.9 FUNCTION KIDNEY DECREASED: ICD-10-CM

## 2019-12-12 LAB
ALBUMIN SERPL-MCNC: 4.3 G/DL (ref 3.4–5)
ALP SERPL-CCNC: 72 U/L (ref 40–150)
ALT SERPL W P-5'-P-CCNC: 38 U/L (ref 0–70)
ANION GAP SERPL CALCULATED.3IONS-SCNC: 5 MMOL/L (ref 3–14)
AST SERPL W P-5'-P-CCNC: 11 U/L (ref 0–45)
BILIRUB SERPL-MCNC: 0.8 MG/DL (ref 0.2–1.3)
BUN SERPL-MCNC: 16 MG/DL (ref 7–30)
CALCIUM SERPL-MCNC: 9.5 MG/DL (ref 8.5–10.1)
CHLORIDE SERPL-SCNC: 109 MMOL/L (ref 94–109)
CO2 SERPL-SCNC: 25 MMOL/L (ref 20–32)
CREAT SERPL-MCNC: 1.01 MG/DL (ref 0.66–1.25)
GFR SERPL CREATININE-BSD FRML MDRD: 84 ML/MIN/{1.73_M2}
GLUCOSE SERPL-MCNC: 105 MG/DL (ref 70–99)
POTASSIUM SERPL-SCNC: 4.2 MMOL/L (ref 3.4–5.3)
PROT SERPL-MCNC: 7.5 G/DL (ref 6.8–8.8)
SODIUM SERPL-SCNC: 139 MMOL/L (ref 133–144)

## 2019-12-12 PROCEDURE — 36415 COLL VENOUS BLD VENIPUNCTURE: CPT | Performed by: FAMILY MEDICINE

## 2019-12-12 PROCEDURE — 80053 COMPREHEN METABOLIC PANEL: CPT | Performed by: FAMILY MEDICINE

## 2019-12-12 NOTE — TELEPHONE ENCOUNTER
Richard came in for his labs today. He would like them refilled ASAP as he is leaving for out of town soon.    Christelle Villalobos  Patient Representative

## 2019-12-13 RX ORDER — SPIRONOLACTONE 50 MG/1
TABLET, FILM COATED ORAL
Qty: 90 TABLET | Refills: 1 | Status: SHIPPED | OUTPATIENT
Start: 2019-12-13 | End: 2020-06-17

## 2019-12-13 RX ORDER — LOSARTAN POTASSIUM 100 MG/1
TABLET ORAL
Qty: 90 TABLET | Refills: 1 | Status: SHIPPED | OUTPATIENT
Start: 2019-12-13 | End: 2019-12-19

## 2019-12-13 NOTE — TELEPHONE ENCOUNTER
Patient calling, re: refills. States labs are now resulted and he is going out of town on 12/14/19 and is out of his medication. He would like them as soon as possible.    Ph: [phone]    Nader RAMIREZ  Patient Representative - [facility] [place]

## 2019-12-13 NOTE — TELEPHONE ENCOUNTER
Pt labs completed. Noted, Rx approved.    Maxi Colmenares RN   [facility]

## 2019-12-13 NOTE — RESULT ENCOUNTER NOTE
Dear Richard,    Here is a summary of your recent test results:  -Kidney function (GFR) is normal now  -Sodium is normal.  -Potassium is normal.  -Calcium is normal.  -Glucose is slight elevated and may be a sign of early diabetes (prediabetes). ADVISE:: eating a low carbohydrate diet, exercising, trying to lose weight (if necessary) and rechecking your glucose level in 12 months.    For additional lab test information, labtestsonline.org is an excellent reference.           Thank you very much for trusting me and [facility].     Healthy regards,  Jose Johnson MD

## 2019-12-19 ENCOUNTER — TELEPHONE (OUTPATIENT)
Dept: FAMILY MEDICINE | Facility: CLINIC | Age: 54
End: 2019-12-19

## 2019-12-19 DIAGNOSIS — I10 BENIGN ESSENTIAL HYPERTENSION: ICD-10-CM

## 2019-12-19 RX ORDER — LOSARTAN POTASSIUM 100 MG/1
100 TABLET ORAL DAILY
Qty: 7 TABLET | Refills: 1 | Status: SHIPPED | OUTPATIENT
Start: 2019-12-19 | End: 2019-12-19

## 2019-12-19 RX ORDER — LOSARTAN POTASSIUM 100 MG/1
100 TABLET ORAL DAILY
Qty: 7 TABLET | Refills: 1 | Status: SHIPPED | OUTPATIENT
Start: 2019-12-19 | End: 2020-06-17

## 2019-12-19 NOTE — TELEPHONE ENCOUNTER
Reason for Call:  Medication or medication refill:    Do you use a [facility]?  Name of the pharmacy and phone number for the current request:   [facility] [address] [facility] [place] [address]      Name of the medication requested: losartan (COZAAR) 100 MG tablet     Other request: Patient on vacation on in [place]. Forgot medication at home. Needs 7 days sent to local pharmacy. Please call patient when sent to pharmacy.     Can we leave a detailed message on this number? YES    Phone number patient can be reached at: Home number on file [phone] (home)    Best Time: anytime     Call taken on 12/19/2019 at 8:43 AM by Mag Cox

## 2019-12-19 NOTE — TELEPHONE ENCOUNTER
Pt needed a refill of 7 days of medication for while on vacation. Pt stated he has forgotten the medication at home and is now in [place].    Maxi Colmenares RN   [facility]

## 2019-12-19 NOTE — TELEPHONE ENCOUNTER
"Requested Prescriptions   Pending Prescriptions Disp Refills     losartan (COZAAR) 100 MG tablet      Last Written Prescription Date:  12.13.19  Last Fill Quantity: 90 tablet,  # refills: 1   Last office visit: 6/6/2019 with prescribing provider:  Richard Johnson MD         Future Office Visit:       90 tablet 1     Sig: Take 1 tablet (100 mg) by mouth daily       Angiotensin-II Receptors Passed - 12/19/2019  8:46 AM        Passed - Last blood pressure under 140/90 in past 12 months     BP Readings from Last 3 Encounters:   06/06/19 130/80   04/13/19 134/82   01/17/19 126/84                 Passed - Recent (12 mo) or future (30 days) visit within the authorizing provider's specialty     Patient has had an office visit with the authorizing provider or a provider within the authorizing providers department within the previous 12 mos or has a future within next 30 days. See \"Patient Info\" tab in inbasket, or \"Choose Columns\" in Meds & Orders section of the refill encounter.              Passed - Medication is active on med list        Passed - Patient is age 18 or older        Passed - Normal serum creatinine on file in past 12 months     Recent Labs   Lab Test 12/12/19  0824   CR 1.01             Passed - Normal serum potassium on file in past 12 months     Recent Labs   Lab Test 12/12/19  0824   POTASSIUM 4.2                    "

## 2019-12-19 NOTE — TELEPHONE ENCOUNTER
States the pharmacy originally given did not have a pharmacy.  Updated in system.  Please resend to [address] [facility].    Veronica Jha

## 2020-03-11 ENCOUNTER — HEALTH MAINTENANCE LETTER (OUTPATIENT)
Age: 55
End: 2020-03-11

## 2020-06-15 DIAGNOSIS — I10 BENIGN ESSENTIAL HYPERTENSION: ICD-10-CM

## 2020-06-16 NOTE — TELEPHONE ENCOUNTER
Left message on answering machine for patient to call back and schedule appointment-  We can give him medication to get him the appointment (no longer than a month out) Thank You,    Carmen BRAVO RN  [facility]  [phone]

## 2020-06-16 NOTE — TELEPHONE ENCOUNTER
Patient calling requesting this asap. It is his bp medication, he stated he has called and the pharmacy called. Call him when the RX has been sent.  [phone] (home)   Ok to leave detailed message: yes  Thank you  Fadumo Quintanilla

## 2020-06-17 RX ORDER — SPIRONOLACTONE 50 MG/1
TABLET, FILM COATED ORAL
Qty: 30 TABLET | Refills: 0 | Status: SHIPPED | OUTPATIENT
Start: 2020-06-17 | End: 2020-06-18

## 2020-06-17 RX ORDER — LOSARTAN POTASSIUM 100 MG/1
TABLET ORAL
Qty: 30 TABLET | Refills: 0 | Status: SHIPPED | OUTPATIENT
Start: 2020-06-17 | End: 2020-06-18

## 2020-06-17 NOTE — TELEPHONE ENCOUNTER
Medication is being filled for 1 time refill only due to:  Patient needs to be seen because it has been more than one year since last visit.    Marilyn Chacon RN  [facility]

## 2020-06-18 ENCOUNTER — VIRTUAL VISIT (OUTPATIENT)
Dept: FAMILY MEDICINE | Facility: CLINIC | Age: 55
End: 2020-06-18
Payer: COMMERCIAL

## 2020-06-18 VITALS — BODY MASS INDEX: 37.59 KG/M2 | WEIGHT: 240 LBS

## 2020-06-18 DIAGNOSIS — G47.33 OSA (OBSTRUCTIVE SLEEP APNEA): ICD-10-CM

## 2020-06-18 DIAGNOSIS — Z12.5 SCREENING FOR PROSTATE CANCER: ICD-10-CM

## 2020-06-18 DIAGNOSIS — L21.9 SEBORRHEIC DERMATITIS OF SCALP: ICD-10-CM

## 2020-06-18 DIAGNOSIS — E66.01 MORBID OBESITY (H): ICD-10-CM

## 2020-06-18 DIAGNOSIS — I10 HYPERTENSION GOAL BP (BLOOD PRESSURE) < 130/80: Primary | ICD-10-CM

## 2020-06-18 PROCEDURE — 99214 OFFICE O/P EST MOD 30 MIN: CPT | Mod: 95 | Performed by: FAMILY MEDICINE

## 2020-06-18 RX ORDER — KETOCONAZOLE 20 MG/ML
SHAMPOO TOPICAL DAILY PRN
Qty: 100 ML | Refills: 1 | Status: SHIPPED | OUTPATIENT
Start: 2020-06-18 | End: 2021-01-25

## 2020-06-18 RX ORDER — LOSARTAN POTASSIUM 100 MG/1
100 TABLET ORAL DAILY
Qty: 90 TABLET | Refills: 1 | Status: SHIPPED | OUTPATIENT
Start: 2020-06-18 | End: 2021-01-14

## 2020-06-18 RX ORDER — SPIRONOLACTONE 50 MG/1
50 TABLET, FILM COATED ORAL DAILY
Qty: 90 TABLET | Refills: 1 | Status: SHIPPED | OUTPATIENT
Start: 2020-06-18 | End: 2021-01-14

## 2020-06-18 NOTE — PROGRESS NOTES
"Subjective   Richard Ann is a 54 year old male who is being evaluated via a billable video visit.      The patient has been notified of following:     \"This video visit will be conducted via a call between you and your physician/provider. We have found that certain health care needs can be provided without the need for an in-person physical exam.  This service lets us provide the care you need with a video conversation.  If a prescription is necessary we can send it directly to your pharmacy.  If lab work is needed we can place an order for that and you can then stop by our lab to have the test done at a later time.    Video visits are billed at different rates depending on your insurance coverage.  Please reach out to your insurance provider with any questions.    If during the course of the call the physician/provider feels a video visit is not appropriate, you will not be charged for this service.\"     Patient has given verbal consent for Video visit? Yes    How would you like to obtain your AVS? [facility]    Patient would like the video invitation sent by: Text to mobile phone:   Telephone Information:   Mobile [phone]           Video Start Time: 9:19 AM    Richard Ann is a 54 year old male who presents today for the following health issues:    Chief Complaint  Patient presents with:  Hypertension       Hypertension Follow-up      Do you check your blood pressure regularly outside of the clinic? No     Are you following a low salt diet? Yes    Are your blood pressures ever more than 140 on the top number (systolic) OR more   than 90 on the bottom number (diastolic), for example 140/90? No      How many servings of fruits and vegetables do you eat daily?  4 or more    On average, how many sweetened beverages do you drink each day (Examples: soda, juice, sweet tea, etc.  Do NOT count diet or artificially sweetened beverages)?   1    How many days per week do you exercise enough to make your heart beat " faster? 7    How many minutes a day do you exercise enough to make your heart beat faster? 60 or more    How many days per week do you miss taking your medication? 0      Reviewed and updated as needed this visit by Provider  Tobacco  Allergies  Meds  Problems  Med Hx  Surg Hx  Fam Hx         ROS: Constitutional, HEENT, cardiovascular, pulmonary, GI, , musculoskeletal, neuro, skin, endocrine and psych systems are negative, except as otherwise noted.       Objective   Reported vitals:  Wt 108.9 kg (240 lb)   BMI 37.59 kg/m     GENERAL: Healthy, alert and no distress  EYES: Eyes grossly normal to inspection.  No discharge or erythema, or obvious scleral/conjunctival abnormalities.  RESP: No audible wheeze, cough, or visible cyanosis.  No visible retractions or increased work of breathing.    SKIN: Visible skin clear. No significant rash, abnormal pigmentation or lesions.  NEURO: Cranial nerves grossly intact.  Mentation and speech appropriate for age.  PSYCH: Mentation appears normal, affect normal/bright, judgement and insight intact, normal speech and appearance well-groomed.    Diagnostic Test Results:  Labs reviewed in Epic        Assessment/Plan:  Hypertension goal BP (blood pressure) < 130/80  - controlled - continue medication.     Seborrheic dermatitis of scalp  - controlled - continue medication.     Morbid obesity (H)  He has lost weight by swimming and improving his diet    SHANAE (obstructive sleep apnea)  Doing better with weight loss    Return in about 1 month (around 7/18/2020).    Video-Visit Details    Type of service:  Video Visit    Video End Time (time video stopped): 9:30 AM    Originating Location (pt. Location): Home    Distant Location (provider location):  [facility]     Platform used for Video Visit: Fariba Johnson MD     [address] [address]  [email or web address]   Office:  [phone]  Pager: [phone]

## 2020-12-27 ENCOUNTER — HEALTH MAINTENANCE LETTER (OUTPATIENT)
Age: 55
End: 2020-12-27

## 2021-01-12 DIAGNOSIS — I10 HYPERTENSION GOAL BP (BLOOD PRESSURE) < 130/80: ICD-10-CM

## 2021-01-13 NOTE — TELEPHONE ENCOUNTER
Routing refill request to provider for review/approval because:          Marilyn Chacon RN  [facility]

## 2021-01-14 RX ORDER — LOSARTAN POTASSIUM 100 MG/1
TABLET ORAL
Qty: 30 TABLET | Refills: 0 | Status: SHIPPED | OUTPATIENT
Start: 2021-01-14 | End: 2021-01-25

## 2021-01-14 RX ORDER — SPIRONOLACTONE 50 MG/1
TABLET, FILM COATED ORAL
Qty: 30 TABLET | Refills: 0 | Status: SHIPPED | OUTPATIENT
Start: 2021-01-14 | End: 2021-01-25

## 2021-01-25 ENCOUNTER — ALLIED HEALTH/NURSE VISIT (OUTPATIENT)
Dept: NURSING | Facility: CLINIC | Age: 56
End: 2021-01-25
Payer: COMMERCIAL

## 2021-01-25 ENCOUNTER — OFFICE VISIT (OUTPATIENT)
Dept: FAMILY MEDICINE | Facility: CLINIC | Age: 56
End: 2021-01-25
Payer: COMMERCIAL

## 2021-01-25 VITALS
DIASTOLIC BLOOD PRESSURE: 80 MMHG | TEMPERATURE: 97 F | SYSTOLIC BLOOD PRESSURE: 120 MMHG | BODY MASS INDEX: 41.44 KG/M2 | HEIGHT: 67 IN | OXYGEN SATURATION: 98 % | WEIGHT: 264 LBS | HEART RATE: 105 BPM

## 2021-01-25 DIAGNOSIS — Z13.220 SCREENING FOR LIPID DISORDERS: ICD-10-CM

## 2021-01-25 DIAGNOSIS — G47.33 OSA (OBSTRUCTIVE SLEEP APNEA): ICD-10-CM

## 2021-01-25 DIAGNOSIS — E29.1 HYPOGONADISM MALE: ICD-10-CM

## 2021-01-25 DIAGNOSIS — E34.9 TESTOSTERONE DEFICIENCY: Primary | ICD-10-CM

## 2021-01-25 DIAGNOSIS — Z12.5 SCREENING FOR PROSTATE CANCER: ICD-10-CM

## 2021-01-25 DIAGNOSIS — E66.01 MORBID OBESITY (H): ICD-10-CM

## 2021-01-25 DIAGNOSIS — E78.5 HYPERLIPIDEMIA LDL GOAL <100: ICD-10-CM

## 2021-01-25 DIAGNOSIS — Z00.00 ROUTINE GENERAL MEDICAL EXAMINATION AT A HEALTH CARE FACILITY: Primary | ICD-10-CM

## 2021-01-25 DIAGNOSIS — E29.1 HYPOTESTOSTERONEMIA IN MALE: ICD-10-CM

## 2021-01-25 DIAGNOSIS — I10 HYPERTENSION GOAL BP (BLOOD PRESSURE) < 130/80: ICD-10-CM

## 2021-01-25 LAB
ALBUMIN SERPL-MCNC: 4 G/DL (ref 3.4–5)
ALP SERPL-CCNC: 62 U/L (ref 40–150)
ALT SERPL W P-5'-P-CCNC: 36 U/L (ref 0–70)
ANION GAP SERPL CALCULATED.3IONS-SCNC: 5 MMOL/L (ref 3–14)
AST SERPL W P-5'-P-CCNC: 13 U/L (ref 0–45)
BILIRUB SERPL-MCNC: 0.8 MG/DL (ref 0.2–1.3)
BUN SERPL-MCNC: 23 MG/DL (ref 7–30)
CALCIUM SERPL-MCNC: 9.6 MG/DL (ref 8.5–10.1)
CHLORIDE SERPL-SCNC: 108 MMOL/L (ref 94–109)
CHOLEST SERPL-MCNC: 235 MG/DL
CO2 SERPL-SCNC: 25 MMOL/L (ref 20–32)
CREAT SERPL-MCNC: 0.99 MG/DL (ref 0.66–1.25)
CREAT UR-MCNC: 155 MG/DL
ERYTHROCYTE [DISTWIDTH] IN BLOOD BY AUTOMATED COUNT: 13.2 % (ref 10–15)
GFR SERPL CREATININE-BSD FRML MDRD: 85 ML/MIN/{1.73_M2}
GLUCOSE SERPL-MCNC: 118 MG/DL (ref 70–99)
HCT VFR BLD AUTO: 41.3 % (ref 40–53)
HDLC SERPL-MCNC: 42 MG/DL
HGB BLD-MCNC: 13.8 G/DL (ref 13.3–17.7)
LDLC SERPL CALC-MCNC: 139 MG/DL
MCH RBC QN AUTO: 27.9 PG (ref 26.5–33)
MCHC RBC AUTO-ENTMCNC: 33.4 G/DL (ref 31.5–36.5)
MCV RBC AUTO: 84 FL (ref 78–100)
MICROALBUMIN UR-MCNC: 9 MG/L
MICROALBUMIN/CREAT UR: 5.52 MG/G CR (ref 0–17)
NONHDLC SERPL-MCNC: 193 MG/DL
PLATELET # BLD AUTO: 150 10E9/L (ref 150–450)
POTASSIUM SERPL-SCNC: 4.6 MMOL/L (ref 3.4–5.3)
PROT SERPL-MCNC: 7.6 G/DL (ref 6.8–8.8)
PSA SERPL-ACNC: 2.29 UG/L (ref 0–4)
RBC # BLD AUTO: 4.94 10E12/L (ref 4.4–5.9)
SODIUM SERPL-SCNC: 138 MMOL/L (ref 133–144)
TRIGL SERPL-MCNC: 272 MG/DL
WBC # BLD AUTO: 4.5 10E9/L (ref 4–11)

## 2021-01-25 PROCEDURE — 82043 UR ALBUMIN QUANTITATIVE: CPT | Performed by: FAMILY MEDICINE

## 2021-01-25 PROCEDURE — 99000 SPECIMEN HANDLING OFFICE-LAB: CPT | Performed by: FAMILY MEDICINE

## 2021-01-25 PROCEDURE — 85027 COMPLETE CBC AUTOMATED: CPT | Performed by: FAMILY MEDICINE

## 2021-01-25 PROCEDURE — 80061 LIPID PANEL: CPT | Performed by: FAMILY MEDICINE

## 2021-01-25 PROCEDURE — G0103 PSA SCREENING: HCPCS | Performed by: FAMILY MEDICINE

## 2021-01-25 PROCEDURE — 84270 ASSAY OF SEX HORMONE GLOBUL: CPT | Performed by: FAMILY MEDICINE

## 2021-01-25 PROCEDURE — 36415 COLL VENOUS BLD VENIPUNCTURE: CPT | Performed by: FAMILY MEDICINE

## 2021-01-25 PROCEDURE — 90471 IMMUNIZATION ADMIN: CPT | Performed by: FAMILY MEDICINE

## 2021-01-25 PROCEDURE — 84403 ASSAY OF TOTAL TESTOSTERONE: CPT | Mod: 90 | Performed by: FAMILY MEDICINE

## 2021-01-25 PROCEDURE — 99396 PREV VISIT EST AGE 40-64: CPT | Mod: 25 | Performed by: FAMILY MEDICINE

## 2021-01-25 PROCEDURE — 96372 THER/PROPH/DIAG INJ SC/IM: CPT

## 2021-01-25 PROCEDURE — 90682 RIV4 VACC RECOMBINANT DNA IM: CPT | Performed by: FAMILY MEDICINE

## 2021-01-25 PROCEDURE — 80053 COMPREHEN METABOLIC PANEL: CPT | Performed by: FAMILY MEDICINE

## 2021-01-25 RX ORDER — TESTOSTERONE CYPIONATE 200 MG/ML
150 INJECTION, SOLUTION INTRAMUSCULAR
Qty: 1 ML | Refills: 5 | Status: SHIPPED | OUTPATIENT
Start: 2021-01-25 | End: 2022-04-11

## 2021-01-25 RX ORDER — LOSARTAN POTASSIUM 100 MG/1
100 TABLET ORAL DAILY
Qty: 90 TABLET | Refills: 3 | Status: SHIPPED | OUTPATIENT
Start: 2021-01-25 | End: 2022-02-04

## 2021-01-25 RX ORDER — SPIRONOLACTONE 50 MG/1
50 TABLET, FILM COATED ORAL DAILY
Qty: 90 TABLET | Refills: 3 | Status: SHIPPED | OUTPATIENT
Start: 2021-01-25 | End: 2022-02-04

## 2021-01-25 RX ADMIN — TESTOSTERONE CYPIONATE 150 MG: 200 INJECTION, SOLUTION INTRAMUSCULAR at 09:45

## 2021-01-25 ASSESSMENT — MIFFLIN-ST. JEOR: SCORE: 1991.13

## 2021-01-25 NOTE — PROGRESS NOTES
"Clinic Administered Medication Documentation      Testosterone Documentation     Prior to injection, verified patient identity using patient's name and date of birth. Medication was administered. Please see MAR and medication order for additional information. Patient instructed to remain in clinic for 15 minutes.    Reminders     - Check vial for refills remaining and initiate refill request if no refills remain.      - Verify with patient that medication was paid for at pharmacy. If it was, check the \"patient supplied\" box on the MAR.     Was entire vial of medication used? No, The remainder 50MG of 200MG was discarded as unavoidable waste.  Vial/Syringe: Single dose vial  Expiration Date:  06/01/2023  Was this medication supplied by the patient? Yes, Medication was received directly from patient in a tamper proof bag (follow site specific policies)     Tiffanie Madrid RN, BSN  [facility]         "

## 2021-01-25 NOTE — PROGRESS NOTES
SUBJECTIVE:   CC: Richard Ann is an 55 year old male who presents for preventive health visit.     HPI      Healthy Habits:    Do you get at least three servings of calcium containing foods daily (dairy, green leafy vegetables, etc.)? yes    Amount of exercise or daily activities, outside of work: 7 day(s) per week    Problems taking medications regularly No    Medication side effects: No    Have you had an eye exam in the past two years? yes    Do you see a dentist twice per year? yes    Do you have sleep apnea, excessive snoring or daytime drowsiness?CPAP   Hypertension Follow-up      Do you check your blood pressure regularly outside of the clinic? Yes     Are you following a low salt diet? Yes    Are your blood pressures ever more than 140 on the top number (systolic) OR more   than 90 on the bottom number (diastolic), for example 140/90? No      Today's PHQ-2 Score:   PHQ-2 ( 1999 [employer]) 1/25/2021 6/18/2020   Q1: Little interest or pleasure in doing things 0 0   Q2: Feeling down, depressed or hopeless 0 0   PHQ-2 Score 0 0       Abuse: Current or Past(Physical, Sexual or Emotional)- No  Do you feel safe in your environment? Yes    Have you ever done Advance Care Planning? (For example, a Health Directive, POLST, or a discussion with a medical provider or your loved ones about your wishes): declined    Social History     Tobacco Use     Smoking status: Never Smoker     Smokeless tobacco: Never Used   Substance Use Topics     Alcohol use: Yes     Alcohol/week: 4.0 standard drinks     Types: 4 Standard drinks or equivalent per week     Comment: 4 drinks per week     If you drink alcohol do you typically have >3 drinks per day or >7 drinks per week? No                      Last PSA:   PSA   Date Value Ref Range Status   04/13/2019 1.84 0 - 4 ug/L Final     Comment:     Assay Method:  Chemiluminescence using Siemens Vista analyzer       Reviewed orders with patient. Reviewed health maintenance and updated  "orders accordingly - Yes  Reviewed and updated as needed this visit by clinical staff  Tobacco  Allergies  Meds  Problems  Med Hx  Surg Hx  Fam Hx  Soc Hx          Reviewed and updated as needed this visit by Provider  Tobacco  Allergies  Meds  Problems  Med Hx  Surg Hx  Fam Hx           ROS:  Constitutional, HEENT, cardiovascular, pulmonary, GI, , musculoskeletal, neuro, skin, endocrine and psych systems are negative, except as otherwise noted.  OBJECTIVE:   /80   Pulse 105   Temp 97  F (36.1  C) (Tympanic)   Ht 1.702 m (5' 7\")   Wt 119.7 kg (264 lb)   SpO2 98%   BMI 41.35 kg/m    EXAM:  GENERAL: healthy, alert and no distress  EYES: Eyes grossly normal to inspection, PERRL and conjunctivae and sclerae normal  HENT: ear canals and TM's normal, nose and mouth without ulcers or lesions  NECK: no adenopathy, no asymmetry, masses, or scars and thyroid normal to palpation  RESP: lungs clear to auscultation - no rales, rhonchi or wheezes  BREAST: normal without masses, tenderness or nipple discharge and no palpable axillary masses or adenopathy  CV: regular rate and rhythm, normal S1 S2, no S3 or S4, no murmur, click or rub, no peripheral edema and peripheral pulses strong  ABDOMEN: soft, nontender, no hepatosplenomegaly, no masses and bowel sounds normal   (male): normal male genitalia without lesions or urethral discharge, no hernia  MS: no gross musculoskeletal defects noted, no edema  SKIN: no suspicious lesions or rashes  NEURO: Normal strength and tone, mentation intact and speech normal  PSYCH: mentation appears normal, affect normal/bright  LYMPH: no cervical, supraclavicular, axillary, or inguinal adenopathy  RECTAL: declined exam      ASSESSMENT/PLAN:   Routine general medical examination at a health care facility      Screening for prostate cancer  - PROSTATE SPEC ANTIGEN SCREEN    Morbid obesity (H)  Diet and exercise    Hypertension goal BP (blood pressure) < 130/80  Controlled " "- continue medication.  - Albumin Random Urine Quantitative with Creat Ratio  - CBC with platelets  - Comprehensive metabolic panel  - losartan (COZAAR) 100 MG tablet  Dispense: 90 tablet; Refill: 3  - spironolactone (ALDACTONE) 50 MG tablet  Dispense: 90 tablet; Refill: 3    Screening for lipid disorders  - Lipid panel reflex to direct LDL Fasting    Hypogonadism male  Has been out of meds and stopped but would like to restart this at 175 mg every 2 weeks  - testosterone cypionate (DEPOTESTOSTERONE) 200 MG/ML injection  Dispense: 1 mL; Refill: 5    Hypotestosteronemia in male  Due for labs  - Testosterone Bioavailable    HSANAE (obstructive sleep apnea)  On CPAP, continue      COUNSELING:  Reviewed preventive health counseling, as reflected in patient instructions    Estimated body mass index is 41.35 kg/m  as calculated from the following:    Height as of this encounter: 1.702 m (5' 7\").    Weight as of this encounter: 119.7 kg (264 lb).  Weight management plan: Discussed healthy diet and exercise guidelines     reports that he has never smoked. He has never used smokeless tobacco.      Return in about 1 year (around 1/25/2022) for wellness exam with fasting labs, in person, with Dr Jose Johnson.           Jose Johnson MD     [address] [address]  [email or web address]     Office: [phone]     "

## 2021-01-27 LAB
SHBG SERPL-SCNC: 16 NMOL/L (ref 11–80)
TESTOST FREE SERPL-MCNC: 7.88 NG/DL (ref 4.7–24.4)
TESTOST SERPL-MCNC: 284 NG/DL (ref 240–950)

## 2021-01-28 PROBLEM — E78.5 HYPERLIPIDEMIA LDL GOAL <100: Status: ACTIVE | Noted: 2021-01-28

## 2021-01-28 RX ORDER — ROSUVASTATIN CALCIUM 5 MG/1
5 TABLET, COATED ORAL DAILY
Qty: 90 TABLET | Refills: 3 | Status: SHIPPED | OUTPATIENT
Start: 2021-01-28 | End: 2022-04-11

## 2021-01-28 NOTE — RESULT ENCOUNTER NOTE
Dear Richard,    Here is a summary of your recent test results:  -Normal red blood cell (hgb) levels, normal white blood cell count and normal platelet levels.  -PSA (prostate specific antigen) test is normal.  This indicates a low likelihood of prostate cancer.  ADVISE: rechecking this in 1 year.  -LDL(bad) cholesterol level is elevated, and your triglycerides are elevated which can increase your heart disease risk.  A diet high in fat and simple carbohydrates, genetics and being overweight can contribute to this. ADVISE: exercising 150 minutes of aerobic exercise per week (30 minutes for 5 days per week or 50 minutes for 3 days per week are options), eating a low saturated fat/low carbohydrate diet, and omega-3 fatty acids (fish oil) 1802-7489 mg daily are helpful to improve this. Current guidelines from the American Heart Association support starting a cholesterol lowering medication to lower your heart and stroke disease risk.  I am sending a prescription to your pharmacy: rosuvastatin(Crestor) 5 mg each evening.  You can call your pharmacy to let them know you would like your prescription filled and if you have concerns about this recommendation then please contact me. In 3 months, you should recheck your fasting cholesterol panel by scheduling a lab-only appointment.     -8.2% of patients that have a similar cholesterol profile to you will have a stroke, heart attack or death (related to heart disease) within the next 10 years and that is considered a high risk and cholesterol lowering medications are recommended at this time (high risk is >10%, or >7.5% if other risk factors such has high blood pressure or other heart disease risk factors).    The ASCVD risk score (Brigido SAHA Jr, et al., 2013) returns the percentage likelihood of a first time Atherosclerotic Cardiovascular Disease (ASCVD) event.    The 10-year ASCVD risk score (Brigido SAHA Jr., et al., 2013) is: 8.2%    Values used to calculate the score:      Age:  55 years      Sex: Male      Is Non- : No      Diabetic: No      Tobacco smoker: No      Systolic Blood Pressure: 120 mmHg      Is BP treated: Yes      HDL Cholesterol: 42 mg/dL      Total Cholesterol: 235 mg/dL        -Liver and gallbladder tests (ALT,AST, Alk phos,bilirubin) are normal.  -Kidney function (GFR) is normal.  -Sodium is normal.  -Potassium is normal.  -Calcium is normal.  -Glucose is slight elevated and may be a sign of early diabetes (prediabetes). ADVISE:: eating a low carbohydrate diet, exercising, trying to lose weight (if necessary) and rechecking your glucose level in 12 months.  -Microalbumin (urine protein) test is normal.  ADVISE: rechecking this annually.  -Testosterone levels are in the normal range and since you have been off medicine when this test was done I am not recommending that you continue testosterone.    For additional lab test information, labtestsonline.org is an excellent reference.           Thank you very much for trusting me and [facility].     Have a peaceful day.    Healthy regards,  Jose Johnson MD

## 2021-02-08 ENCOUNTER — ALLIED HEALTH/NURSE VISIT (OUTPATIENT)
Dept: NURSING | Facility: CLINIC | Age: 56
End: 2021-02-08
Payer: COMMERCIAL

## 2021-02-08 DIAGNOSIS — E34.9 TESTOSTERONE DEFICIENCY: Primary | ICD-10-CM

## 2021-02-08 PROCEDURE — 96372 THER/PROPH/DIAG INJ SC/IM: CPT

## 2021-02-08 RX ADMIN — TESTOSTERONE CYPIONATE 150 MG: 200 INJECTION, SOLUTION INTRAMUSCULAR at 09:45

## 2021-02-08 NOTE — PROGRESS NOTES
"Clinic Administered Medication Documentation      Testosterone Documentation     Prior to injection, verified patient identity using patient's name and date of birth. Medication was administered. Please see MAR and medication order for additional information. Patient instructed to report any adverse reaction to staff immediately .    Reminders     - Check vial for refills remaining and initiate refill request if no refills remain.      - Verify with patient that medication was paid for at pharmacy. If it was, check the \"patient supplied\" box on the MAR.     Was entire vial of medication used? No, The remainder 0.25 ML of 1ML was discarded as unavoidable waste.  Vial/Syringe: Single dose vial  Expiration Date:  06/2023  Was this medication supplied by the patient? Yes, Medication was received directly from patient in a tamper proof bag (follow site specific policies)     Tiffanie Madrid RN, BSN  [facility]         "

## 2021-02-23 ENCOUNTER — ALLIED HEALTH/NURSE VISIT (OUTPATIENT)
Dept: NURSING | Facility: CLINIC | Age: 56
End: 2021-02-23
Payer: COMMERCIAL

## 2021-02-23 DIAGNOSIS — E29.1 HYPOGONADISM MALE: Primary | ICD-10-CM

## 2021-02-23 PROCEDURE — 96372 THER/PROPH/DIAG INJ SC/IM: CPT

## 2021-02-23 RX ADMIN — TESTOSTERONE CYPIONATE 150 MG: 200 INJECTION, SOLUTION INTRAMUSCULAR at 15:00

## 2021-02-23 NOTE — PROGRESS NOTES
"Clinic Administered Medication Documentation      Testosterone Documentation     Prior to injection, verified patient identity using patient's name and date of birth. Medication was administered. Please see MAR and medication order for additional information. Patient instructed to report any adverse reaction to staff immediately  and stay in clinic after the injection but patient declined.    Reminders     - Check vial for refills remaining and initiate refill request if no refills remain.      - Verify with patient that medication was paid for at pharmacy. If it was, check the \"patient supplied\" box on the MAR.     Was entire vial of medication used? No, The remainder 50MG of 200MG was discarded as unavoidable waste.  Vial/Syringe: Single dose vial  Expiration Date:  06/2023  Was this medication supplied by the patient? Yes, Medication was received directly from patient in a tamper proof bag (follow site specific policies)    Pt tolerated injection well. Pt had no questions at this time.    Maxi LUJAN RN   [facility]      "

## 2021-03-08 ENCOUNTER — ALLIED HEALTH/NURSE VISIT (OUTPATIENT)
Dept: NURSING | Facility: CLINIC | Age: 56
End: 2021-03-08
Payer: COMMERCIAL

## 2021-03-08 DIAGNOSIS — E29.1 HYPOGONADISM MALE: Primary | ICD-10-CM

## 2021-03-08 DIAGNOSIS — E34.9 TESTOSTERONE DEFICIENCY: ICD-10-CM

## 2021-03-08 PROCEDURE — 96372 THER/PROPH/DIAG INJ SC/IM: CPT

## 2021-03-08 RX ADMIN — TESTOSTERONE CYPIONATE 150 MG: 200 INJECTION, SOLUTION INTRAMUSCULAR at 09:19

## 2021-03-08 NOTE — PROGRESS NOTES
"Clinic Administered Medication Documentation      Testosterone Documentation     Prior to injection, verified patient identity using patient's name and date of birth. Medication was administered. Please see MAR and medication order for additional information. Patient instructed to report any adverse reaction to staff immediately .    Reminders     - Check vial for refills remaining and initiate refill request if no refills remain.      - Verify with patient that medication was paid for at pharmacy. If it was, check the \"patient supplied\" box on the MAR.     Was entire vial of medication used? No, The remainder 50MG of 200MG was discarded as unavoidable waste.  Vial/Syringe: Single dose vial  Expiration Date:  06/2023  Was this medication supplied by the patient? Yes, Medication was received directly from patient in a tamper proof bag (follow site specific policies)     Tiffanie Madrid RN, BSN  [facility]         "

## 2021-07-16 ENCOUNTER — OFFICE VISIT (OUTPATIENT)
Dept: FAMILY MEDICINE | Facility: CLINIC | Age: 56
End: 2021-07-16
Payer: COMMERCIAL

## 2021-07-16 VITALS
OXYGEN SATURATION: 98 % | HEART RATE: 96 BPM | HEIGHT: 67 IN | TEMPERATURE: 97.6 F | DIASTOLIC BLOOD PRESSURE: 74 MMHG | WEIGHT: 274 LBS | BODY MASS INDEX: 43 KG/M2 | SYSTOLIC BLOOD PRESSURE: 124 MMHG

## 2021-07-16 DIAGNOSIS — H60.501 ACUTE OTITIS EXTERNA OF RIGHT EAR, UNSPECIFIED TYPE: ICD-10-CM

## 2021-07-16 DIAGNOSIS — H81.11 BENIGN PAROXYSMAL POSITIONAL VERTIGO, RIGHT: Primary | ICD-10-CM

## 2021-07-16 PROCEDURE — 99213 OFFICE O/P EST LOW 20 MIN: CPT | Mod: 25 | Performed by: FAMILY MEDICINE

## 2021-07-16 PROCEDURE — 95992 CANALITH REPOSITIONING PROC: CPT | Performed by: FAMILY MEDICINE

## 2021-07-16 RX ORDER — MECLIZINE HYDROCHLORIDE 25 MG/1
25 TABLET ORAL 3 TIMES DAILY PRN
COMMUNITY
Start: 2021-07-16 | End: 2022-04-11

## 2021-07-16 RX ORDER — NEOMYCIN SULFATE, POLYMYXIN B SULFATE, HYDROCORTISONE 3.5; 10000; 1 MG/ML; [USP'U]/ML; MG/ML
3-4 SOLUTION/ DROPS AURICULAR (OTIC) 4 TIMES DAILY
Qty: 10 ML | Refills: 0 | Status: SHIPPED | OUTPATIENT
Start: 2021-07-16 | End: 2023-07-07

## 2021-07-16 RX ORDER — PREDNISONE 20 MG/1
TABLET ORAL
Qty: 20 TABLET | Refills: 0 | Status: SHIPPED | OUTPATIENT
Start: 2021-07-16 | End: 2021-07-29

## 2021-07-16 ASSESSMENT — MIFFLIN-ST. JEOR: SCORE: 2036.49

## 2021-07-16 NOTE — PROGRESS NOTES
"    Assessment & Plan   Benign paroxysmal positional vertigo, right  Epley maneuver done today in clinic and symptoms were improved on discharge, recommended keeping chin up next 24 hours and as much as possible.  Link to Epley maneuver maneuver video in patient instructions.  - predniSONE (DELTASONE) 20 MG tablet  Dispense: 20 tablet; Refill: 0    Acute otitis externa of right ear, unspecified type  Swims and has pain with moving earlobe, no significant exudate or swelling and he has concerned about swimmer's ear.  - neomycin-polymyxin-hydrocortisone (CORTISPORIN) [id number] otic solution  Dispense: 10 mL; Refill: 0    BMI:   Estimated body mass index is 42.91 kg/m  as calculated from the following:    Height as of this encounter: 1.702 m (5' 7\").    Weight as of this encounter: 124.3 kg (274 lb).   Weight management plan: Discussed healthy diet and exercise guidelines      Return in about 1 week (around 7/23/2021) for symptoms failing to improve or sooner if worsening.      Jose Johnson MD      [address] [address]  [email or web address]   Office: [phone]       Bhakti Ramos is a 55 year old who presents for the following health issues     HPI     Acute Illness  Acute illness concerns: ear pain   Onset/Duration: on and off for 6 days  Symptoms:  Fever: no  Chills/Sweats: no  Headache (location?): no  Sinus Pressure: no  Conjunctivitis:  no  Ear Pain: YES: right - causing him dizziness (vertigo) as well, plugged sensation.   No ringing  Rhinorrhea: yes, slight stuffiness  Congestion: no  Sore Throat: no  Cough: no  Wheeze: no  Decreased Appetite: no  Nausea: some with vertigo  Vomiting: no  Diarrhea: no  Dysuria/Freq.: no  Dysuria or Hematuria: no  Fatigue/Achiness: no  Sick/Strep Exposure: no  Therapies tried and outcome: None    Vertigo - Prior vertigo ~10 years ago    No head trauma.       Review of Systems   Constitutional, HEENT, cardiovascular, " "pulmonary, gi and gu systems are negative, except as otherwise noted.      Objective    /74 (BP Location: Left arm, Cuff Size: Adult Large)   Pulse 96   Temp 97.6  F (36.4  C) (Tympanic)   Ht 1.702 m (5' 7\")   Wt 124.3 kg (274 lb)   SpO2 98%   BMI 42.91 kg/m    Body mass index is 42.91 kg/m .  Physical Exam   /74 (BP Location: Left arm, Cuff Size: Adult Large)   Pulse 96   Temp 97.6  F (36.4  C) (Tympanic)   Ht 1.702 m (5' 7\")   Wt 124.3 kg (274 lb)   SpO2 98%   BMI 42.91 kg/m    GENERAL: no apparent distress  EYES: Conjunctiva are not injected, no discharge.  EARS: Left TM -no erythema, no effusion,  not bulged.               Right TM -no erythema, no effusion,  not bulged. But some pain with movement of earlobe  NOSE: no discharge, no sinus tenderness  THROAT: no erythema, no exudate, no lesions  NECK: supple, no adenopathy.  CARDIAC: regular rate and rhythm, no murmur  RESP: clear, no wheezing, no rales, no rhonchi  ABD: soft, no distension, no tenderness  SKIN: No rashes  Neuro- nonfocal            "

## 2021-07-16 NOTE — PATIENT INSTRUCTIONS
Video: https://[email or web address].[email or web address]    Patient Education     Benign Paroxysmal Positional Vertigo    Benign paroxysmal positional vertigo (BPPV) is a common condition. You feel as if the room is spinning after changing position, moving your head quickly, or even just rolling over in bed.  Vertigo is a false feeling of motion plus disorientation that makes it seem as if the room is spinning. A vertigo attack may cause sudden nausea, vomiting, and heavy sweating. Severe vertigo causes a loss of balance. You may even fall down.  Vertigo is caused by a problem with the inner ear. The inner ear is located behind the middle ear. It is a part of the balance center of the body. It contains small calcium particles within fluid-filled canals (semi-circular canals). These particles can move out of position. This may happen as a result of aging, head injury, or disease of the inner ear. Once that happens, moving your head in certain ways may cause the particles to stimulate the inner ear. This creates the feeling of vertigo.  An episode of vertigo may last seconds, minutes, or hours. Once you are over the first episode of vertigo, it may never return. Sometimes symptoms return off and on for several weeks or longer.  BPPV is treatable. The Epley maneuver is a simple treatment for the common cause of vertigo. Your provider may try to put the calcium particles back in their correct position by having you do a series of head movements.  Home care  Follow these guidelines when caring for yourself at home:    Rest quietly in bed if your symptoms are severe. Change position slowly. There is usually 1 position that will feel best. This might be lying on one side or lying on your back with your head slightly raised on pillows. Until you have no symptoms, you are at a higher risk of falling. Let someone help you when you get up. Get rid of home hazards such as loose electrical cords and throw rugs. Don t walk in unfamiliar areas that  aren't lighted. Use night lights in bathrooms and kitchen areas.    Don't drive or work with dangerous machinery for 1 week after symptoms go away. This is in case symptoms return suddenly.    Take medicine as prescribed to relieve your symptoms. Unless another medicine was prescribed for nausea, vomiting, and vertigo, you may use over-the-counter motion sickness medicine. Examples of this include meclizine and dimenhydrinate. Don't take over-the-counter medicine for this condition without talking with your healthcare provider, especially the first time.  Follow-up care  Follow up with your healthcare provider or an ear, nose, and throat doctor (ENT or otolaryngologist), or as directed. Tell your provider about any ringing in your ear or hearing loss.  If you had a CT or MRI scan, a specialist will review it. You'll be told of any new findings that may affect your care.  When to get medical advice  Call your healthcare provider right away if any of these occur:    Vertigo gets worse even after taking prescribed medicine    Repeated vomiting even after taking prescribed medicine    Weakness that gets worse    Trouble hearing    Fever of 100.4 F (38 C) or higher, or as directed by your healthcare provider  Call 911  Call 911 right away if any of these occur:    Fainting    Severe headache or abnormal drowsiness or confusion    Weakness of an arm or leg or one side of the face    Trouble with speech or vision    Trouble walking    Seizure    Fast heart rate    Chest pain  Tonia last reviewed this educational content on 5/1/2020 2000-2021 [employer]. All rights reserved. This information is not intended as a substitute for professional medical care. Always follow your healthcare professional's instructions.

## 2021-07-27 ENCOUNTER — NURSE TRIAGE (OUTPATIENT)
Dept: FAMILY MEDICINE | Facility: CLINIC | Age: 56
End: 2021-07-27

## 2021-07-27 DIAGNOSIS — J32.9 SINUSITIS, UNSPECIFIED CHRONICITY, UNSPECIFIED LOCATION: Primary | ICD-10-CM

## 2021-07-27 NOTE — TELEPHONE ENCOUNTER
Patient calling to state that he is having shooting pain behind his eyes. He does not have a fever at this time, but he's in a lot of pain. Patient does not have insurance at this time and will be paying out of pocket. He's wondering if there's something that can be prescribed.     Joel Espinoza

## 2021-07-30 NOTE — TELEPHONE ENCOUNTER
Called # [phone]     Pt called and advised of RX sent in. Advised will need to be seen if not improved. Patient stated an understanding and agreed with plan.      Maxi Colmenares RN   [facility]

## 2021-07-30 NOTE — TELEPHONE ENCOUNTER
"Called # [phone]     S-(situation): Pt seen recently for BPPV and ear infection    B-(background): Pt stated he was seen for a ear pain with vertigo symptoms. Pt noted was given medications to treat, ear pain and vertigo has resolved. Pt now reporting sinus congestion and eye pain with photosensitivity.     A-(assessment): Pt and writer discussed symptoms, noted pain to be left eye behind eye, denies vision changes or halo. Pt denies fever, nasal drainage, cough, SOB. Pt does note a mild HA due to pain. Pt noted has been using pseudoephedrine with relief but does not want to continue to take. Pt requesting abx. Pt noted does not want to return as no insurance.     R-(recommendations): Writer advised will discuss further with Dr. Johnson. Writer advised to trial Mucinex, tylenol 1000 mg TID or Ibuprofen 800 mg TID, warm liquids, hot showers, and plenty of fluid.       Maxi Colmenares RN   [facility] [place] T[facility]    Answer Assessment - Initial Assessment Questions  1. ONSET: \"When did the pain start?\" (e.g., minutes, hours, days)      monday  2. TIMING: \"Does the pain come and go, or has it been constant since it started?\" (e.g., constant, intermittent, fleeting)      Comes and goes  3. SEVERITY: \"How bad is the pain?\"   (Scale 1-10; mild, moderate or severe)    - MILD (1-3): doesn't interfere with normal activities     - MODERATE (4-7): interferes with normal activities or awakens from sleep     - SEVERE (8-10): excruciating pain and patient unable to do normal activities      8-9/10  4. LOCATION: \"Where does it hurt?\"  (e.g., eyelid, eye, cheekbone)      Left eye behind  5. CAUSE: \"What do you think is causing the pain?\"      sinusitis  6. VISION: \"Do you have blurred vision or changes in your vision?\"       none  7. EYE DISCHARGE: \"Is there any discharge (pus) from the eye(s)?\"  If yes, ask: \"What color is it?\"       no  8. FEVER: \"Do you have a fever?\" If so, ask: \"What is it, how was it " "measured, and when did it start?\"       no  9. OTHER SYMPTOMS: \"Do you have any other symptoms?\" (e.g., headache, nasal discharge, facial rash)      Some sinus congestion  10. PREGNANCY: \"Is there any chance you are pregnant?\" \"When was your last menstrual period?\"        No      Pt seen for vertigo like symptoms, had ear pain. Given drops, ears resolved but now having eye pain and sinus congestion    Has been using psuedophed, does go away for a while but does come back    Photosensitivity    Protocols used: EYE PAIN-A-OH      "

## 2021-09-27 NOTE — TELEPHONE ENCOUNTER
.   Reason for Call:  Request for results:    Name of test or procedure: Testosterone level     Date of test of procedure: [id number]     Location of the test or procedure: [facility] to leave the result message on voice mail or with a family member? NO    Phone number Patient can be reached at:  Cell number on file:    Telephone Information:   Mobile [phone]     Call taken on 7/7/2017 at 8:56 AM by Goldie Coker

## 2021-10-08 ENCOUNTER — MYC MEDICAL ADVICE (OUTPATIENT)
Dept: FAMILY MEDICINE | Facility: CLINIC | Age: 56
End: 2021-10-08

## 2021-10-08 DIAGNOSIS — Z11.59 SCREENING FOR VIRAL DISEASE: Primary | ICD-10-CM

## 2021-10-08 NOTE — TELEPHONE ENCOUNTER
Called and spoke with patient.     States he has a sore throat that started on Tuesday. States he feels like he has a chest cold. Also has productive cough. Felt worse last week with headache and fatigue. States his wife tested positive about 12 days ago. Denies fever.     Patient doesn't think he needs a visit for his symptoms. Advised covid testing. Order placed. Patient doesn't want to drive to [place] for a test. Reviewed other options such as [facility] or [facility]. Advised patient that if covid test is negative we could see him in clinic if symptoms worsening. Patient stated an understanding and agreed with plan.     Ana Gray RN  [facility]

## 2021-10-08 NOTE — TELEPHONE ENCOUNTER
Please see Mychart note. Patient calling for a response.    Patient has had symptoms for three days that include: Sore throat, headache, chest cold and tightness, cough, and fatigue.  Patient has been vaccinated for COVID-19.  Wife had a positive test 15 days ago.

## 2021-10-08 NOTE — TELEPHONE ENCOUNTER
Recommend evisit or if he just wants a covid test ok to order that without OV      Jenise Acosta MBA, MS, PA-C

## 2021-10-09 ENCOUNTER — HEALTH MAINTENANCE LETTER (OUTPATIENT)
Age: 56
End: 2021-10-09

## 2021-12-14 ENCOUNTER — MYC MEDICAL ADVICE (OUTPATIENT)
Dept: FAMILY MEDICINE | Facility: CLINIC | Age: 56
End: 2021-12-14
Payer: COMMERCIAL

## 2022-02-04 DIAGNOSIS — I10 HYPERTENSION GOAL BP (BLOOD PRESSURE) < 130/80: ICD-10-CM

## 2022-02-04 RX ORDER — SPIRONOLACTONE 50 MG/1
50 TABLET, FILM COATED ORAL DAILY
Qty: 90 TABLET | Refills: 0 | Status: SHIPPED | OUTPATIENT
Start: 2022-02-04 | End: 2022-04-11

## 2022-02-04 RX ORDER — LOSARTAN POTASSIUM 100 MG/1
100 TABLET ORAL DAILY
Qty: 90 TABLET | Refills: 0 | Status: SHIPPED | OUTPATIENT
Start: 2022-02-04 | End: 2022-04-11

## 2022-02-04 NOTE — TELEPHONE ENCOUNTER
Routing refill request to provider for review/approval because:  Creatinine   Date Value Ref Range Status   01/25/2021 0.99 0.66 - 1.25 mg/dL Final      Staci Mccullough, RN

## 2022-02-04 NOTE — TELEPHONE ENCOUNTER
Overdue for wellness, 1 prescription sent, please schedule follow-up within the next month or 2    Last visit in this dept:    7/16/2021     Next visit in this dept:       Health Maintenance   Topic Date Due     INFLUENZA VACCINE (1) 09/01/2021     COVID-19 Vaccine (3 - Booster for Pfizer series) 09/22/2021     PHQ-2  01/01/2022     BMP  01/25/2022     MICROALBUMIN  01/25/2022     ANNUAL REVIEW OF HM ORDERS  01/25/2022     PREVENTIVE CARE VISIT  01/25/2022     PSA  01/25/2022     COLORECTAL CANCER SCREENING  08/03/2022     LIPID  01/25/2026     ADVANCE CARE PLANNING  01/25/2026     DTAP/TDAP/TD IMMUNIZATION (2 - Td or Tdap) 11/19/2028     HEPATITIS C SCREENING  Completed     HIV SCREENING  Completed     ZOSTER IMMUNIZATION  Completed     Pneumococcal Vaccine: Pediatrics (0 to 5 Years) and At-Risk Patients (6 to 64 Years)  Aged Out     IPV IMMUNIZATION  Aged Out     MENINGITIS IMMUNIZATION  Aged Out     HEPATITIS B IMMUNIZATION  Aged Out

## 2022-02-04 NOTE — TELEPHONE ENCOUNTER
Patient inquiring about refill. Patient is leaving for out of town on Sunday and only has a small amount left.     #[phone], Ok to leave detailed message.     Joel Espinoza

## 2022-03-26 ENCOUNTER — HEALTH MAINTENANCE LETTER (OUTPATIENT)
Age: 57
End: 2022-03-26

## 2022-04-11 ENCOUNTER — OFFICE VISIT (OUTPATIENT)
Dept: FAMILY MEDICINE | Facility: CLINIC | Age: 57
End: 2022-04-11
Payer: COMMERCIAL

## 2022-04-11 VITALS
DIASTOLIC BLOOD PRESSURE: 74 MMHG | OXYGEN SATURATION: 98 % | WEIGHT: 279 LBS | BODY MASS INDEX: 43.79 KG/M2 | HEIGHT: 67 IN | HEART RATE: 90 BPM | TEMPERATURE: 97.3 F | SYSTOLIC BLOOD PRESSURE: 122 MMHG

## 2022-04-11 DIAGNOSIS — J06.9 UPPER RESPIRATORY TRACT INFECTION, UNSPECIFIED TYPE: ICD-10-CM

## 2022-04-11 DIAGNOSIS — R73.09 ELEVATED GLUCOSE: ICD-10-CM

## 2022-04-11 DIAGNOSIS — E78.5 HYPERLIPIDEMIA LDL GOAL <100: ICD-10-CM

## 2022-04-11 DIAGNOSIS — E66.01 MORBID OBESITY (H): ICD-10-CM

## 2022-04-11 DIAGNOSIS — Z12.11 SCREEN FOR COLON CANCER: ICD-10-CM

## 2022-04-11 DIAGNOSIS — Z00.00 ROUTINE GENERAL MEDICAL EXAMINATION AT A HEALTH CARE FACILITY: Primary | ICD-10-CM

## 2022-04-11 DIAGNOSIS — I10 HYPERTENSION GOAL BP (BLOOD PRESSURE) < 130/80: ICD-10-CM

## 2022-04-11 DIAGNOSIS — E29.1 HYPOGONADISM MALE: ICD-10-CM

## 2022-04-11 DIAGNOSIS — G47.33 OSA (OBSTRUCTIVE SLEEP APNEA): ICD-10-CM

## 2022-04-11 DIAGNOSIS — Z12.5 SCREENING FOR PROSTATE CANCER: ICD-10-CM

## 2022-04-11 PROCEDURE — 99396 PREV VISIT EST AGE 40-64: CPT | Performed by: FAMILY MEDICINE

## 2022-04-11 PROCEDURE — 82043 UR ALBUMIN QUANTITATIVE: CPT | Performed by: FAMILY MEDICINE

## 2022-04-11 RX ORDER — ROSUVASTATIN CALCIUM 5 MG/1
5 TABLET, COATED ORAL DAILY
Qty: 90 TABLET | Refills: 3 | Status: SHIPPED | OUTPATIENT
Start: 2022-04-11 | End: 2023-07-07

## 2022-04-11 RX ORDER — TESTOSTERONE CYPIONATE 200 MG/ML
150 INJECTION, SOLUTION INTRAMUSCULAR
Qty: 1 ML | Refills: 5 | Status: SHIPPED | OUTPATIENT
Start: 2022-04-11 | End: 2023-07-07

## 2022-04-11 RX ORDER — LOSARTAN POTASSIUM 100 MG/1
100 TABLET ORAL DAILY
Qty: 90 TABLET | Refills: 0 | Status: SHIPPED | OUTPATIENT
Start: 2022-04-11 | End: 2022-08-16

## 2022-04-11 RX ORDER — SPIRONOLACTONE 50 MG/1
50 TABLET, FILM COATED ORAL DAILY
Qty: 90 TABLET | Refills: 0 | Status: SHIPPED | OUTPATIENT
Start: 2022-04-11 | End: 2022-08-16

## 2022-04-11 ASSESSMENT — ENCOUNTER SYMPTOMS
HEARTBURN: 0
HEMATOCHEZIA: 0
PALPITATIONS: 0
MYALGIAS: 0
HEADACHES: 0
NAUSEA: 0
HEMATURIA: 0
SHORTNESS OF BREATH: 1
DIZZINESS: 0
DYSURIA: 0
DIARRHEA: 0
FEVER: 0
ABDOMINAL PAIN: 0
NERVOUS/ANXIOUS: 0
JOINT SWELLING: 0
ARTHRALGIAS: 0
CHILLS: 0
CONSTIPATION: 0
WEAKNESS: 1
FREQUENCY: 0
PARESTHESIAS: 0
EYE PAIN: 0
COUGH: 1
SORE THROAT: 0

## 2022-04-11 NOTE — PROGRESS NOTES
SUBJECTIVE:   CC: Richard Ann is an 56 year old male who presents for preventative health visit.     Patient has been advised of split billing requirements and indicates understanding: Yes     Healthy Habits:     Getting at least 3 servings of Calcium per day:  Yes    Bi-annual eye exam:  Yes    Dental care twice a year:  Yes    Sleep apnea or symptoms of sleep apnea:  Sleep apnea    Diet:  Carbohydrate counting    Frequency of exercise:  4-5 days/week    Duration of exercise:  45-60 minutes    Taking medications regularly:  Yes    Medication side effects:  None    PHQ-2 Total Score: 0    Additional concerns today:  No     Acute Illness   Acute illness concerns: congestion, cough- had two negative at home covid tests  Onset: started 3-4 days - recently was in [place], [place]    Fever: no    Chills/Sweats: no    Headache (location?): no    Sinus Pressure:no    Conjunctivitis:  no    Ear Pain: no    Rhinorrhea: YES    Congestion: YES    Sore Throat: YES     Cough: YES    Wheeze: no    Decreased Appetite: no    Nausea: no    Vomiting: no    Diarrhea:  no    Dysuria/Freq.: no    Fatigue/Achiness: YES    Sick/Strep Exposure: YES     Therapies Tried and outcome:       H/o allergies and better lately -     Today's PHQ-2 Score:   PHQ-2 ( 1999 Pfizer) 4/11/2022   Q1: Little interest or pleasure in doing things 0   Q2: Feeling down, depressed or hopeless 0   PHQ-2 Score 0   PHQ-2 Total Score (12-17 Years)- Positive if 3 or more points; Administer PHQ-A if positive -   Q1: Little interest or pleasure in doing things Not at all   Q2: Feeling down, depressed or hopeless Not at all   PHQ-2 Score 0       Abuse: Current or Past(Physical, Sexual or Emotional)- No  Do you feel safe in your environment? Yes    Social History     Tobacco Use     Smoking status: Never Smoker     Smokeless tobacco: Never Used   Substance Use Topics     Alcohol use: Yes     Alcohol/week: 4.0 standard drinks     Types: 4 Standard drinks or equivalent per  "week     Comment: 4 drinks per week     If you drink alcohol do you typically have >3 drinks per day or >7 drinks per week? No    Alcohol Use 4/11/2022   Prescreen: >3 drinks/day or >7 drinks/week? No   Prescreen: >3 drinks/day or >7 drinks/week? -       Last PSA:   PSA   Date Value Ref Range Status   01/25/2021 2.29 0 - 4 ug/L Final     Comment:     Assay Method:  Chemiluminescence using Siemens Vista analyzer       Reviewed orders with patient. Reviewed health maintenance and updated orders accordingly - Yes    Reviewed and updated as needed this visit by clinical staff   Tobacco  Allergies  Meds  Problems  Med Hx  Surg Hx  Fam Hx  Soc   Hx          Reviewed and updated as needed this visit by Provider   Tobacco  Allergies  Meds  Problems  Med Hx  Surg Hx  Fam Hx               Review of Systems   Constitutional: Negative for chills and fever.   HENT: Positive for congestion. Negative for ear pain, hearing loss and sore throat.    Eyes: Negative for pain and visual disturbance.   Respiratory: Positive for cough and shortness of breath.    Cardiovascular: Negative for chest pain, palpitations and peripheral edema.   Gastrointestinal: Negative for abdominal pain, constipation, diarrhea, heartburn, hematochezia and nausea.   Genitourinary: Positive for impotence. Negative for dysuria, frequency, genital sores, hematuria, penile discharge and urgency.   Musculoskeletal: Negative for arthralgias, joint swelling and myalgias.   Skin: Negative for rash.   Neurological: Positive for weakness. Negative for dizziness, headaches and paresthesias.   Psychiatric/Behavioral: Negative for mood changes. The patient is not nervous/anxious.         OBJECTIVE:   /74 (BP Location: Left arm, Cuff Size: Adult Large)   Pulse 90   Temp 97.3  F (36.3  C) (Tympanic)   Ht 1.702 m (5' 7\")   Wt 126.6 kg (279 lb)   SpO2 98%   BMI 43.70 kg/m    EXAM:  GENERAL: healthy, alert and no distress  EYES: Eyes grossly normal to " inspection, PERRL and conjunctivae and sclerae normal  HENT: ear canals and TM's normal, nose and mouth without ulcers or lesions  NECK: no adenopathy, no asymmetry, masses, or scars and thyroid normal to palpation  RESP: lungs clear to auscultation - no rales, rhonchi or wheezes  BREAST: normal without masses, tenderness or nipple discharge and no palpable axillary masses or adenopathy  CV: regular rate and rhythm, normal S1 S2, no S3 or S4, no murmur, click or rub, no peripheral edema and peripheral pulses strong  ABDOMEN: soft, nontender, no hepatosplenomegaly, no masses and bowel sounds normal   (male): normal male genitalia without lesions or urethral discharge, no hernia  MS: no gross musculoskeletal defects noted, no edema  SKIN: no suspicious lesions or rashes  NEURO: Normal strength and tone, mentation intact and speech normal  PSYCH: mentation appears normal, affect normal/bright  LYMPH: no cervical, supraclavicular, axillary, or inguinal adenopathy  RECTAL: declined exam    ASSESSMENT/PLAN:   Routine general medical examination at a health care facility      Hypertension goal BP (blood pressure) < 130/80  Controlled - continue medication.   - Albumin Random Urine Quantitative with Creat Ratio  - losartan (COZAAR) 100 MG tablet  Dispense: 90 tablet; Refill: 0  - spironolactone (ALDACTONE) 50 MG tablet  Dispense: 90 tablet; Refill: 0  - Comprehensive metabolic panel (BMP + Alb, Alk Phos, ALT, AST, Total. Bili, TP)    Hyperlipidemia LDL goal <100  Controlled - continue medication.   - rosuvastatin (CRESTOR) 5 MG tablet  Dispense: 90 tablet; Refill: 3  - Comprehensive metabolic panel (BMP + Alb, Alk Phos, ALT, AST, Total. Bili, TP)  - Lipid panel reflex to direct LDL Fasting    Hypogonadism male  Will restart  - testosterone cypionate (DEPOTESTOSTERONE) 200 MG/ML injection  Dispense: 1 mL; Refill: 5    Screening for prostate cancer  - PROSTATE SPEC ANTIGEN SCREEN    Morbid obesity (H)    SHANAE (obstructive  "sleep apnea)  Needs replacement cpap -  - CPAP Order for DME - ONLY FOR DME    Screen for colon cancer  due  - Adult Gastro Ref - Procedure Only      COUNSELING:  Reviewed preventive health counseling, as reflected in patient instructions      Estimated body mass index is 43.7 kg/m  as calculated from the following:    Height as of this encounter: 1.702 m (5' 7\").    Weight as of this encounter: 126.6 kg (279 lb).  Weight management plan: Discussed healthy diet and exercise guidelines     reports that he has never smoked. He has never used smokeless tobacco.      Return in about 1 week (around 4/18/2022) for symptoms failing to improve or sooner if worsening.         Jose Johnson MD     [address] [address]  [employer]     Office: [phone]     "

## 2022-04-12 LAB
CREAT UR-MCNC: 57 MG/DL
MICROALBUMIN UR-MCNC: 5 MG/L
MICROALBUMIN/CREAT UR: 8.77 MG/G CR (ref 0–17)

## 2022-04-13 ENCOUNTER — TELEPHONE (OUTPATIENT)
Dept: SLEEP MEDICINE | Facility: CLINIC | Age: 57
End: 2022-04-13
Payer: COMMERCIAL

## 2022-04-13 NOTE — RESULT ENCOUNTER NOTE
Dear Richard,    Here is a summary of your recent test results:  -Microalbumin (urine protein) test is normal.  ADVISE: rechecking this annually.           Thank you very much for trusting me and [facility].     Have a peaceful day.    Healthy regards,  Jose Johnson MD

## 2022-04-13 NOTE — TELEPHONE ENCOUNTER
Received Rx for a replacement CPAP and left message to let patient know he will be eligible for a replacement CPAP in 2023 and to return call if he has questions or need to order new CPAP supplies.

## 2022-04-21 ENCOUNTER — ALLIED HEALTH/NURSE VISIT (OUTPATIENT)
Dept: NURSING | Facility: CLINIC | Age: 57
End: 2022-04-21
Payer: COMMERCIAL

## 2022-04-21 ENCOUNTER — LAB (OUTPATIENT)
Dept: LAB | Facility: CLINIC | Age: 57
End: 2022-04-21
Payer: COMMERCIAL

## 2022-04-21 DIAGNOSIS — Z12.5 SCREENING FOR PROSTATE CANCER: ICD-10-CM

## 2022-04-21 DIAGNOSIS — E29.1 HYPOGONADISM MALE: ICD-10-CM

## 2022-04-21 DIAGNOSIS — E78.5 HYPERLIPIDEMIA LDL GOAL <100: ICD-10-CM

## 2022-04-21 DIAGNOSIS — E29.1 HYPOGONADISM MALE: Primary | ICD-10-CM

## 2022-04-21 DIAGNOSIS — I10 HYPERTENSION GOAL BP (BLOOD PRESSURE) < 130/80: ICD-10-CM

## 2022-04-21 LAB
ALBUMIN SERPL-MCNC: 4.4 G/DL (ref 3.4–5)
ALP SERPL-CCNC: 68 U/L (ref 40–150)
ALT SERPL W P-5'-P-CCNC: 35 U/L (ref 0–70)
ANION GAP SERPL CALCULATED.3IONS-SCNC: 7 MMOL/L (ref 3–14)
AST SERPL W P-5'-P-CCNC: 14 U/L (ref 0–45)
BILIRUB SERPL-MCNC: 1.8 MG/DL (ref 0.2–1.3)
BUN SERPL-MCNC: 16 MG/DL (ref 7–30)
CALCIUM SERPL-MCNC: 9.8 MG/DL (ref 8.5–10.1)
CHLORIDE BLD-SCNC: 104 MMOL/L (ref 94–109)
CHOLEST SERPL-MCNC: 241 MG/DL
CO2 SERPL-SCNC: 25 MMOL/L (ref 20–32)
CREAT SERPL-MCNC: 1.08 MG/DL (ref 0.66–1.25)
FASTING STATUS PATIENT QL REPORTED: YES
GFR SERPL CREATININE-BSD FRML MDRD: 81 ML/MIN/1.73M2
GLUCOSE BLD-MCNC: 193 MG/DL (ref 70–99)
HDLC SERPL-MCNC: 44 MG/DL
LDLC SERPL CALC-MCNC: 168 MG/DL
NONHDLC SERPL-MCNC: 197 MG/DL
POTASSIUM BLD-SCNC: 4.2 MMOL/L (ref 3.4–5.3)
PROT SERPL-MCNC: 8.2 G/DL (ref 6.8–8.8)
PSA SERPL-MCNC: 3.61 UG/L (ref 0–4)
SHBG SERPL-SCNC: 18 NMOL/L (ref 11–80)
SODIUM SERPL-SCNC: 136 MMOL/L (ref 133–144)
TRIGL SERPL-MCNC: 144 MG/DL

## 2022-04-21 PROCEDURE — 80053 COMPREHEN METABOLIC PANEL: CPT

## 2022-04-21 PROCEDURE — 84270 ASSAY OF SEX HORMONE GLOBUL: CPT

## 2022-04-21 PROCEDURE — 36415 COLL VENOUS BLD VENIPUNCTURE: CPT

## 2022-04-21 PROCEDURE — 80061 LIPID PANEL: CPT

## 2022-04-21 PROCEDURE — 96372 THER/PROPH/DIAG INJ SC/IM: CPT | Performed by: PHYSICIAN ASSISTANT

## 2022-04-21 PROCEDURE — G0103 PSA SCREENING: HCPCS

## 2022-04-21 PROCEDURE — 84403 ASSAY OF TOTAL TESTOSTERONE: CPT

## 2022-04-21 RX ADMIN — TESTOSTERONE CYPIONATE 150 MG: 200 INJECTION, SOLUTION INTRAMUSCULAR at 08:57

## 2022-04-21 NOTE — LETTER
May 4, 2022      Richard Ann  [address] [address]        Dear ,    We are writing to inform you of your test results.    Here is a summary of your recent test results:  -PSA (prostate specific antigen) test is normal.  This indicates a low likelihood of prostate cancer.  ADVISE: rechecking this in 1 year.  -Cholesterol levels are above your goal levels.  ADVISE: Taking your rosuvastatin 5 mg daily (did you miss doses recently?),  Our medication, a regular exercise program with at least 150 minutes of aerobic exercise per week, and eating a low saturated fat/low carbohydrate diet.  Also, you should recheck this fasting cholesterol panel in 3 months.  -Liver and gallbladder tests (ALT,AST, Alk phos,bilirubin) are normal.  -Kidney function (GFR) is normal.  -Sodium is normal.  -Potassium is normal.  -Calcium is normal.  -Glucose is elevated and a sign diabetes.  ADVISE: scheduling an appointment with me to discuss treatment recommendations.     Testosterone shows that the replacement dosing is okay.    Resulted Orders   PROSTATE SPEC ANTIGEN SCREEN   Result Value Ref Range    Prostate Specific Antigen Screen 3.61 0.00 - 4.00 ug/L   Comprehensive metabolic panel (BMP + Alb, Alk Phos, ALT, AST, Total. Bili, TP)   Result Value Ref Range    Sodium 136 133 - 144 mmol/L    Potassium 4.2 3.4 - 5.3 mmol/L    Chloride 104 94 - 109 mmol/L    Carbon Dioxide (CO2) 25 20 - 32 mmol/L    Anion Gap 7 3 - 14 mmol/L    Urea Nitrogen 16 7 - 30 mg/dL    Creatinine 1.08 0.66 - 1.25 mg/dL    Calcium 9.8 8.5 - 10.1 mg/dL    Glucose 193 (H) 70 - 99 mg/dL    Alkaline Phosphatase 68 40 - 150 U/L    AST 14 0 - 45 U/L    ALT 35 0 - 70 U/L    Protein Total 8.2 6.8 - 8.8 g/dL    Albumin 4.4 3.4 - 5.0 g/dL    Bilirubin Total 1.8 (H) 0.2 - 1.3 mg/dL    GFR Estimate 81 >60 mL/min/1.73m2      Comment:      Effective December 21, 2021 eGFRcr in adults is calculated using the 2021 CKD-EPI creatinine equation which  includes age and gender (Jose erickson al., NEJ, DOI: 10.1056/XMBSjh1355948)   Lipid panel reflex to direct LDL Fasting   Result Value Ref Range    Cholesterol 241 (H) <200 mg/dL    Triglycerides 144 <150 mg/dL    Direct Measure HDL 44 >=40 mg/dL    LDL Cholesterol Calculated 168 (H) <=100 mg/dL    Non HDL Cholesterol 197 (H) <130 mg/dL    Patient Fasting > 8hrs? Yes     Narrative    Cholesterol  Desirable:  <200 mg/dL    Triglycerides  Normal:  Less than 150 mg/dL  Borderline High:  150-199 mg/dL  High:  200-499 mg/dL  Very High:  Greater than or equal to 500 mg/dL    Direct Measure HDL  Female:  Greater than or equal to 50 mg/dL   Male:  Greater than or equal to 40 mg/dL    LDL Cholesterol  Desirable:  <100mg/dL  Above Desirable:  100-129 mg/dL   Borderline High:  130-159 mg/dL   High:  160-189 mg/dL   Very High:  >= 190 mg/dL    Non HDL Cholesterol  Desirable:  130 mg/dL  Above Desirable:  130-159 mg/dL  Borderline High:  160-189 mg/dL  High:  190-219 mg/dL  Very High:  Greater than or equal to 220 mg/dL   Sex Hormone Binding Globulin   Result Value Ref Range    Sex Hormone Binding Globulin 18 11 - 80 nmol/L   Testosterone Free and Total   Result Value Ref Range    Free Testosterone Calculated 7.65 ng/dL      Comment:      Male Hernando Ranges:  Hernando Stage I: Less than or equal to 0.37 ng/dL  Hrenando Stage II: 0.03-2.1 ng/dL  Hernando Stage III: 0.10-9.8 ng/dL  Hernando Stage IV: 3.5-16.9 ng/dL  Hernando Stage V: 4.1-23.9 ng/dL    Testosterone Total 288 240 - 950 ng/dL    Narrative    This test was developed and its performance characteristics determined by the [facility],  Special Chemistry Laboratory. It has not been cleared or approved by the FDA. The laboratory is regulated under CLIA as qualified to perform high-complexity testing. This test is used for clinical purposes. It should not be regarded as investigational or for research.       If you have any questions or concerns, please call  the clinic at the number listed above.       Sincerely,      Richard Johnson MD

## 2022-04-22 LAB
TESTOST FREE SERPL-MCNC: 7.65 NG/DL
TESTOST SERPL-MCNC: 288 NG/DL (ref 240–950)

## 2022-04-22 NOTE — RESULT ENCOUNTER NOTE
Dear Richard,    Here is a summary of your recent test results:  -PSA (prostate specific antigen) test is normal.  This indicates a low likelihood of prostate cancer.  ADVISE: rechecking this in 1 year.  -Cholesterol levels are above your goal levels.  ADVISE: Taking your rosuvastatin 5 mg daily (did you miss doses recently?),  Our medication, a regular exercise program with at least 150 minutes of aerobic exercise per week, and eating a low saturated fat/low carbohydrate diet.  Also, you should recheck this fasting cholesterol panel in 3 months.  -Liver and gallbladder tests (ALT,AST, Alk phos,bilirubin) are normal.  -Kidney function (GFR) is normal.  -Sodium is normal.  -Potassium is normal.  -Calcium is normal.  -Glucose is elevated and a sign diabetes.  ADVISE: scheduling an appointment with me to discuss treatment recommendations.    Testosterone shows that the replacement dosing is okay.           Thank you very much for trusting me and [facility].     Have a peaceful day.    Healthy regards,  Jose Johnson MD

## 2022-04-28 NOTE — RESULT ENCOUNTER NOTE
Note to Staff: please call the patient and reviewing onset diabetes diagnosis and need for follow-up appointment -he did not read his previously sent MyChart result note.

## 2022-05-05 ENCOUNTER — ALLIED HEALTH/NURSE VISIT (OUTPATIENT)
Dept: NURSING | Facility: CLINIC | Age: 57
End: 2022-05-05
Payer: COMMERCIAL

## 2022-05-05 DIAGNOSIS — E29.1 HYPOGONADISM MALE: Primary | ICD-10-CM

## 2022-05-05 PROCEDURE — 96372 THER/PROPH/DIAG INJ SC/IM: CPT | Performed by: PHYSICIAN ASSISTANT

## 2022-05-05 RX ADMIN — TESTOSTERONE CYPIONATE 150 MG: 200 INJECTION, SOLUTION INTRAMUSCULAR at 08:17

## 2022-05-05 NOTE — PROGRESS NOTES
"Clinic Administered Medication Documentation          Testosterone Documentation     Prior to injection, verified patient identity using patient's name and date of birth. Medication was administered. Please see MAR and medication order for additional information. Patient instructed to report any adverse reaction to staff immediately  and stay in clinic after the injection but patient declined.    Reminders     - Check vial for refills remaining and initiate refill request if no refills remain.      - Verify with patient that medication was paid for at pharmacy. If it was, check the \"patient supplied\" box on the MAR.     Was entire vial of medication used? No, The remainder 50 MG of 200 MG was discarded as unavoidable waste.  Vial/Syringe: Single dose vial  Expiration Date:  09/2024  Was this medication supplied by the patient? Yes, Medication was received directly from patient in a tamper proof bag (follow site specific policies)    Pt tolerated injection well, had no further questions or concerns.    Maxi LUJAN RN   [facility] [place] T[facility]    "

## 2022-08-15 ENCOUNTER — MYC REFILL (OUTPATIENT)
Dept: FAMILY MEDICINE | Facility: CLINIC | Age: 57
End: 2022-08-15

## 2022-08-15 DIAGNOSIS — I10 HYPERTENSION GOAL BP (BLOOD PRESSURE) < 130/80: ICD-10-CM

## 2022-08-15 RX ORDER — SPIRONOLACTONE 50 MG/1
50 TABLET, FILM COATED ORAL DAILY
Qty: 90 TABLET | Refills: 0 | Status: CANCELLED | OUTPATIENT
Start: 2022-08-15

## 2022-08-15 RX ORDER — LOSARTAN POTASSIUM 100 MG/1
100 TABLET ORAL DAILY
Qty: 90 TABLET | Refills: 0 | Status: CANCELLED | OUTPATIENT
Start: 2022-08-15

## 2022-08-16 ENCOUNTER — MYC MEDICAL ADVICE (OUTPATIENT)
Dept: FAMILY MEDICINE | Facility: CLINIC | Age: 57
End: 2022-08-16

## 2022-08-16 DIAGNOSIS — Z29.89 ALTITUDE SICKNESS PREVENTATIVE MEASURES: ICD-10-CM

## 2022-08-17 NOTE — TELEPHONE ENCOUNTER
Please see my chart message below     Please review and advise     Thank you     Tiffanie Madrid RN, BSN  [place] Triage

## 2022-08-18 RX ORDER — ACETAZOLAMIDE 250 MG/1
250 TABLET ORAL 2 TIMES DAILY
Qty: 30 TABLET | Refills: 1 | Status: SHIPPED | OUTPATIENT
Start: 2022-08-18 | End: 2023-07-07

## 2022-09-17 ENCOUNTER — HEALTH MAINTENANCE LETTER (OUTPATIENT)
Age: 57
End: 2022-09-17

## 2022-10-24 ENCOUNTER — TELEPHONE (OUTPATIENT)
Dept: FAMILY MEDICINE | Facility: CLINIC | Age: 57
End: 2022-10-24

## 2022-10-24 DIAGNOSIS — D48.5 NEOPLASM OF UNCERTAIN BEHAVIOR OF SKIN: Primary | ICD-10-CM

## 2022-10-24 NOTE — TELEPHONE ENCOUNTER
Referral Request  (Newest Message First)  Richard Ann  Patient Referral Message Pool 27 minutes ago (12:10 PM)     Richard Ann would like to request a referral.  Reason: Growth under skin on left side of nose  Requested provider: Dermatologist  Comment:  I have had a growth under the skin on left side of my nose for many months and have tried many treatments .  It needs to be removed   Thank you.  Richard   [phone]  __________________________________________________________________      Please see message above - referral to derm?     Please advise     Thank you     Tiffanie Madrid RN, BSN  [facility]         Left arm;

## 2022-10-25 NOTE — TELEPHONE ENCOUNTER
Called #   Telephone Information:   Mobile [phone]         Left a non detailed VM     My chart message sent with information     Tiffanie Madrid RN, BSN  [facility]

## 2022-11-01 ENCOUNTER — MYC REFILL (OUTPATIENT)
Dept: FAMILY MEDICINE | Facility: CLINIC | Age: 57
End: 2022-11-01

## 2022-11-01 DIAGNOSIS — I10 HYPERTENSION GOAL BP (BLOOD PRESSURE) < 130/80: ICD-10-CM

## 2022-11-02 ENCOUNTER — MYC REFILL (OUTPATIENT)
Dept: FAMILY MEDICINE | Facility: CLINIC | Age: 57
End: 2022-11-02

## 2022-11-02 DIAGNOSIS — I10 HYPERTENSION GOAL BP (BLOOD PRESSURE) < 130/80: ICD-10-CM

## 2022-11-02 RX ORDER — SPIRONOLACTONE 50 MG/1
TABLET, FILM COATED ORAL
Qty: 90 TABLET | Refills: 0 | OUTPATIENT
Start: 2022-11-02

## 2022-11-02 RX ORDER — LOSARTAN POTASSIUM 100 MG/1
100 TABLET ORAL EVERY MORNING
Qty: 90 TABLET | Refills: 0 | Status: CANCELLED | OUTPATIENT
Start: 2022-11-02

## 2022-11-02 RX ORDER — SPIRONOLACTONE 50 MG/1
50 TABLET, FILM COATED ORAL EVERY MORNING
Qty: 90 TABLET | Refills: 0 | Status: CANCELLED | OUTPATIENT
Start: 2022-11-02

## 2022-11-02 RX ORDER — LOSARTAN POTASSIUM 100 MG/1
TABLET ORAL
Qty: 90 TABLET | Refills: 0 | OUTPATIENT
Start: 2022-11-02

## 2022-11-02 NOTE — TELEPHONE ENCOUNTER
Routing refill request to provider for review/approval because:  Drug interaction warning  Lance BRAVO RN, BSN

## 2022-11-03 RX ORDER — LOSARTAN POTASSIUM 100 MG/1
100 TABLET ORAL EVERY MORNING
Qty: 90 TABLET | Refills: 1 | Status: SHIPPED | OUTPATIENT
Start: 2022-11-03 | End: 2023-05-09

## 2022-11-03 RX ORDER — SPIRONOLACTONE 50 MG/1
50 TABLET, FILM COATED ORAL EVERY MORNING
Qty: 90 TABLET | Refills: 1 | Status: SHIPPED | OUTPATIENT
Start: 2022-11-03 | End: 2023-05-09

## 2022-11-22 ENCOUNTER — TRANSFERRED RECORDS (OUTPATIENT)
Dept: HEALTH INFORMATION MANAGEMENT | Facility: CLINIC | Age: 57
End: 2022-11-22

## 2022-12-22 ENCOUNTER — APPOINTMENT (OUTPATIENT)
Dept: URBAN - METROPOLITAN AREA CLINIC 255 | Age: 57
Setting detail: DERMATOLOGY
End: 2022-12-25

## 2022-12-22 PROBLEM — C44.1192 BASAL CELL CARCINOMA OF SKIN OF LEFT LOWER EYELID, INCLUDING CANTHUS: Status: ACTIVE | Noted: 2022-12-22

## 2022-12-22 PROCEDURE — 14301 TIS TRNFR ANY 30.1-60 SQ CM: CPT

## 2022-12-22 PROCEDURE — 17312 MOHS ADDL STAGE: CPT

## 2022-12-22 PROCEDURE — OTHER MOHS SURGERY: OTHER

## 2022-12-22 PROCEDURE — OTHER PRESCRIPTION: OTHER

## 2022-12-22 PROCEDURE — 17311 MOHS 1 STAGE H/N/HF/G: CPT

## 2022-12-22 PROCEDURE — OTHER MIPS QUALITY: OTHER

## 2022-12-22 RX ORDER — CLINDAMYCIN HYDROCHLORIDE 300 MG/1
CAPSULE ORAL BID
Qty: 14 | Refills: 0 | Status: ERX | COMMUNITY
Start: 2022-12-22

## 2022-12-22 NOTE — PROCEDURE: MIPS QUALITY
Quality 130: Documentation Of Current Medications In The Medical Record: Current Medications Documented
Quality 431: Preventive Care And Screening: Unhealthy Alcohol Use - Screening: Patient not identified as an unhealthy alcohol user when screened for unhealthy alcohol use using a systematic screening method
Quality 110: Preventive Care And Screening: Influenza Immunization: Influenza Immunization not Administered because Patient Refused.
Detail Level: Detailed
Quality 226: Preventive Care And Screening: Tobacco Use: Screening And Cessation Intervention: Patient screened for tobacco use and is an ex/non-smoker
Quality 143: Oncology: Medical And Radiation- Pain Intensity Quantified: Pain severity quantified, no pain present

## 2022-12-22 NOTE — PROCEDURE: MOHS SURGERY
Body Location Override (Optional - Billing Will Still Be Based On Selected Body Map Location If Applicable): Left Medial Canthus & Cheek

## 2023-01-05 ENCOUNTER — APPOINTMENT (OUTPATIENT)
Dept: URBAN - METROPOLITAN AREA CLINIC 255 | Age: 58
Setting detail: DERMATOLOGY
End: 2023-01-30

## 2023-01-05 DIAGNOSIS — Z48.02 ENCOUNTER FOR REMOVAL OF SUTURES: ICD-10-CM

## 2023-01-05 PROCEDURE — OTHER SUTURE REMOVAL (GLOBAL PERIOD): OTHER

## 2023-01-05 PROCEDURE — OTHER DIAGNOSIS COMMENT: OTHER

## 2023-01-05 PROCEDURE — 99024 POSTOP FOLLOW-UP VISIT: CPT

## 2023-01-05 PROCEDURE — OTHER COUNSELING: OTHER

## 2023-01-05 PROCEDURE — OTHER MIPS QUALITY: OTHER

## 2023-01-05 ASSESSMENT — LOCATION SIMPLE DESCRIPTION DERM: LOCATION SIMPLE: LEFT CHEEK

## 2023-01-05 ASSESSMENT — LOCATION ZONE DERM: LOCATION ZONE: FACE

## 2023-01-05 ASSESSMENT — LOCATION DETAILED DESCRIPTION DERM: LOCATION DETAILED: LEFT MEDIAL MALAR CHEEK

## 2023-01-05 NOTE — PROCEDURE: SUTURE REMOVAL (GLOBAL PERIOD)
Add 27657 Cpt? (Important Note: In 2017 The Use Of 80096 Is Being Tracked By Cms To Determine Future Global Period Reimbursement For Global Periods): no
Detail Level: Detailed

## 2023-01-05 NOTE — PROCEDURE: MIPS QUALITY
Quality 431: Preventive Care And Screening: Unhealthy Alcohol Use - Screening: Patient not identified as an unhealthy alcohol user when screened for unhealthy alcohol use using a systematic screening method
Quality 110: Preventive Care And Screening: Influenza Immunization: Influenza Immunization not Administered because Patient Refused.
Quality 130: Documentation Of Current Medications In The Medical Record: Current Medications Documented
right forearm
Quality 226: Preventive Care And Screening: Tobacco Use: Screening And Cessation Intervention: Patient screened for tobacco use and is an ex/non-smoker
Detail Level: Detailed

## 2023-02-07 ENCOUNTER — APPOINTMENT (OUTPATIENT)
Dept: URBAN - METROPOLITAN AREA CLINIC 255 | Age: 58
Setting detail: DERMATOLOGY
End: 2023-02-08

## 2023-02-07 DIAGNOSIS — L91.0 HYPERTROPHIC SCAR: ICD-10-CM

## 2023-02-07 PROCEDURE — 11900 INJECT SKIN LESIONS </W 7: CPT | Mod: 79

## 2023-02-07 PROCEDURE — OTHER COUNSELING: OTHER

## 2023-02-07 PROCEDURE — OTHER MIPS QUALITY: OTHER

## 2023-02-07 PROCEDURE — OTHER INTRALESIONAL KENALOG: OTHER

## 2023-02-07 ASSESSMENT — LOCATION ZONE DERM: LOCATION ZONE: FACE

## 2023-02-07 ASSESSMENT — LOCATION DETAILED DESCRIPTION DERM: LOCATION DETAILED: LEFT MEDIAL MALAR CHEEK

## 2023-02-07 ASSESSMENT — LOCATION SIMPLE DESCRIPTION DERM: LOCATION SIMPLE: LEFT CHEEK

## 2023-03-09 ENCOUNTER — APPOINTMENT (OUTPATIENT)
Dept: URBAN - METROPOLITAN AREA CLINIC 255 | Age: 58
Setting detail: DERMATOLOGY
End: 2023-03-13

## 2023-03-09 DIAGNOSIS — L91.0 HYPERTROPHIC SCAR: ICD-10-CM

## 2023-03-09 PROCEDURE — OTHER INTRALESIONAL KENALOG: OTHER

## 2023-03-09 PROCEDURE — OTHER MIPS QUALITY: OTHER

## 2023-03-09 PROCEDURE — OTHER COUNSELING: OTHER

## 2023-03-09 PROCEDURE — 11900 INJECT SKIN LESIONS </W 7: CPT | Mod: 79

## 2023-03-09 ASSESSMENT — LOCATION ZONE DERM: LOCATION ZONE: FACE

## 2023-03-09 ASSESSMENT — LOCATION DETAILED DESCRIPTION DERM: LOCATION DETAILED: LEFT MEDIAL MALAR CHEEK

## 2023-03-09 ASSESSMENT — LOCATION SIMPLE DESCRIPTION DERM: LOCATION SIMPLE: LEFT CHEEK

## 2023-04-20 ENCOUNTER — PATIENT OUTREACH (OUTPATIENT)
Dept: CARE COORDINATION | Facility: CLINIC | Age: 58
End: 2023-04-20
Payer: COMMERCIAL

## 2023-05-08 DIAGNOSIS — I10 HYPERTENSION GOAL BP (BLOOD PRESSURE) < 130/80: ICD-10-CM

## 2023-05-08 NOTE — TELEPHONE ENCOUNTER
Patient is calling to report he thought he had refills at the pharmacy, but pharmacy informed him he doesn't. Patient has 2 days left on his medication. Pharmacy informed patient to request refills and have it expedited. Please fill as able.

## 2023-05-09 RX ORDER — SPIRONOLACTONE 50 MG/1
50 TABLET, FILM COATED ORAL EVERY MORNING
Qty: 90 TABLET | Refills: 0 | Status: SHIPPED | OUTPATIENT
Start: 2023-05-09 | End: 2023-07-07

## 2023-05-09 RX ORDER — LOSARTAN POTASSIUM 100 MG/1
100 TABLET ORAL EVERY MORNING
Qty: 90 TABLET | Refills: 0 | Status: SHIPPED | OUTPATIENT
Start: 2023-05-09 | End: 2023-07-07

## 2023-05-09 NOTE — TELEPHONE ENCOUNTER
LOV: 4/11/2022   Patient due for physical  No future appt scheduled    [place]outing to [place] to assist in scheduling. Route to provider once appt is scheduled.     Ana Gary RN, BSN  [facility]

## 2023-05-11 RX ORDER — LOSARTAN POTASSIUM 100 MG/1
100 TABLET ORAL EVERY MORNING
Qty: 90 TABLET | Refills: 1 | OUTPATIENT
Start: 2023-05-11

## 2023-05-11 RX ORDER — SPIRONOLACTONE 50 MG/1
50 TABLET, FILM COATED ORAL EVERY MORNING
Qty: 90 TABLET | Refills: 1 | OUTPATIENT
Start: 2023-05-11

## 2023-06-04 ENCOUNTER — HEALTH MAINTENANCE LETTER (OUTPATIENT)
Age: 58
End: 2023-06-04

## 2023-07-07 ENCOUNTER — TELEPHONE (OUTPATIENT)
Dept: FAMILY MEDICINE | Facility: CLINIC | Age: 58
End: 2023-07-07

## 2023-07-07 ENCOUNTER — OFFICE VISIT (OUTPATIENT)
Dept: FAMILY MEDICINE | Facility: CLINIC | Age: 58
End: 2023-07-07
Payer: COMMERCIAL

## 2023-07-07 VITALS
HEART RATE: 96 BPM | BODY MASS INDEX: 41.28 KG/M2 | HEIGHT: 67 IN | TEMPERATURE: 97.6 F | WEIGHT: 263 LBS | OXYGEN SATURATION: 100 % | SYSTOLIC BLOOD PRESSURE: 118 MMHG | RESPIRATION RATE: 18 BRPM | DIASTOLIC BLOOD PRESSURE: 80 MMHG

## 2023-07-07 DIAGNOSIS — I10 HYPERTENSION GOAL BP (BLOOD PRESSURE) < 130/80: ICD-10-CM

## 2023-07-07 DIAGNOSIS — R73.09 ELEVATED GLUCOSE: ICD-10-CM

## 2023-07-07 DIAGNOSIS — Z12.11 SCREEN FOR COLON CANCER: ICD-10-CM

## 2023-07-07 DIAGNOSIS — G47.33 OSA (OBSTRUCTIVE SLEEP APNEA): ICD-10-CM

## 2023-07-07 DIAGNOSIS — N52.9 ERECTILE DYSFUNCTION, UNSPECIFIED ERECTILE DYSFUNCTION TYPE: ICD-10-CM

## 2023-07-07 DIAGNOSIS — E78.5 HYPERLIPIDEMIA LDL GOAL <100: ICD-10-CM

## 2023-07-07 DIAGNOSIS — Z29.89 ALTITUDE SICKNESS PREVENTATIVE MEASURES: ICD-10-CM

## 2023-07-07 DIAGNOSIS — Z12.5 SCREENING FOR PROSTATE CANCER: ICD-10-CM

## 2023-07-07 DIAGNOSIS — E66.01 MORBID OBESITY (H): ICD-10-CM

## 2023-07-07 DIAGNOSIS — Z00.00 ROUTINE GENERAL MEDICAL EXAMINATION AT A HEALTH CARE FACILITY: Primary | ICD-10-CM

## 2023-07-07 LAB
ALBUMIN SERPL BCG-MCNC: 4.9 G/DL (ref 3.5–5.2)
ALP SERPL-CCNC: 61 U/L (ref 40–129)
ALT SERPL W P-5'-P-CCNC: 35 U/L (ref 0–70)
ANION GAP SERPL CALCULATED.3IONS-SCNC: 14 MMOL/L (ref 7–15)
AST SERPL W P-5'-P-CCNC: 24 U/L (ref 0–45)
BILIRUB SERPL-MCNC: 0.8 MG/DL
BUN SERPL-MCNC: 20.1 MG/DL (ref 6–20)
CALCIUM SERPL-MCNC: 10.1 MG/DL (ref 8.6–10)
CHLORIDE SERPL-SCNC: 103 MMOL/L (ref 98–107)
CHOLEST SERPL-MCNC: 210 MG/DL
CREAT SERPL-MCNC: 0.93 MG/DL (ref 0.67–1.17)
CREAT UR-MCNC: 143 MG/DL
DEPRECATED HCO3 PLAS-SCNC: 23 MMOL/L (ref 22–29)
GFR SERPL CREATININE-BSD FRML MDRD: >90 ML/MIN/1.73M2
GLUCOSE SERPL-MCNC: 131 MG/DL (ref 70–99)
HBA1C MFR BLD: 6.2 % (ref 0–5.6)
HDLC SERPL-MCNC: 41 MG/DL
LDLC SERPL CALC-MCNC: 143 MG/DL
MICROALBUMIN UR-MCNC: <12 MG/L
MICROALBUMIN/CREAT UR: NORMAL MG/G{CREAT}
NONHDLC SERPL-MCNC: 169 MG/DL
POTASSIUM SERPL-SCNC: 4.3 MMOL/L (ref 3.4–5.3)
PROT SERPL-MCNC: 7.8 G/DL (ref 6.4–8.3)
PSA SERPL DL<=0.01 NG/ML-MCNC: 2.01 NG/ML (ref 0–3.5)
SODIUM SERPL-SCNC: 140 MMOL/L (ref 136–145)
TRIGL SERPL-MCNC: 131 MG/DL

## 2023-07-07 PROCEDURE — 82043 UR ALBUMIN QUANTITATIVE: CPT | Performed by: FAMILY MEDICINE

## 2023-07-07 PROCEDURE — 83036 HEMOGLOBIN GLYCOSYLATED A1C: CPT | Performed by: FAMILY MEDICINE

## 2023-07-07 PROCEDURE — 80061 LIPID PANEL: CPT | Performed by: FAMILY MEDICINE

## 2023-07-07 PROCEDURE — 82570 ASSAY OF URINE CREATININE: CPT | Performed by: FAMILY MEDICINE

## 2023-07-07 PROCEDURE — 80053 COMPREHEN METABOLIC PANEL: CPT | Performed by: FAMILY MEDICINE

## 2023-07-07 PROCEDURE — 36415 COLL VENOUS BLD VENIPUNCTURE: CPT | Performed by: FAMILY MEDICINE

## 2023-07-07 PROCEDURE — G0103 PSA SCREENING: HCPCS | Performed by: FAMILY MEDICINE

## 2023-07-07 PROCEDURE — 99396 PREV VISIT EST AGE 40-64: CPT | Performed by: FAMILY MEDICINE

## 2023-07-07 RX ORDER — ACETAZOLAMIDE 250 MG/1
250 TABLET ORAL 2 TIMES DAILY
Qty: 30 TABLET | Refills: 1 | Status: SHIPPED | OUTPATIENT
Start: 2023-07-07 | End: 2023-11-19

## 2023-07-07 RX ORDER — TESTOSTERONE CYPIONATE 200 MG/ML
150 INJECTION, SOLUTION INTRAMUSCULAR
Qty: 1 ML | Refills: 5 | Status: CANCELLED | OUTPATIENT
Start: 2023-07-07

## 2023-07-07 RX ORDER — SILDENAFIL 100 MG/1
100 TABLET, FILM COATED ORAL DAILY PRN
Qty: 30 TABLET | Refills: 11 | Status: SHIPPED | OUTPATIENT
Start: 2023-07-07

## 2023-07-07 RX ORDER — LOSARTAN POTASSIUM 100 MG/1
100 TABLET ORAL EVERY MORNING
Qty: 90 TABLET | Refills: 3 | Status: SHIPPED | OUTPATIENT
Start: 2023-07-07 | End: 2024-07-19

## 2023-07-07 RX ORDER — ROSUVASTATIN CALCIUM 5 MG/1
5 TABLET, COATED ORAL DAILY
Qty: 90 TABLET | Refills: 3 | Status: SHIPPED | OUTPATIENT
Start: 2023-07-07 | End: 2023-07-12 | Stop reason: DRUGHIGH

## 2023-07-07 RX ORDER — SPIRONOLACTONE 50 MG/1
50 TABLET, FILM COATED ORAL EVERY MORNING
Qty: 90 TABLET | Refills: 3 | Status: SHIPPED | OUTPATIENT
Start: 2023-07-07 | End: 2024-07-23

## 2023-07-07 RX ORDER — ACETAZOLAMIDE 250 MG/1
250 TABLET ORAL 2 TIMES DAILY
Qty: 30 TABLET | Refills: 1 | Status: CANCELLED | OUTPATIENT
Start: 2023-07-07

## 2023-07-07 ASSESSMENT — ENCOUNTER SYMPTOMS
MYALGIAS: 0
HEMATURIA: 0
WEAKNESS: 0
HEADACHES: 0
HEARTBURN: 0
SORE THROAT: 0
CONSTIPATION: 0
DIZZINESS: 0
ARTHRALGIAS: 0
DIARRHEA: 0
CHILLS: 0
PARESTHESIAS: 0
PALPITATIONS: 0
EYE PAIN: 0
NERVOUS/ANXIOUS: 0
HEMATOCHEZIA: 0
JOINT SWELLING: 0
ABDOMINAL PAIN: 0
FREQUENCY: 0
FEVER: 0
COUGH: 0
NAUSEA: 0
DYSURIA: 0
SHORTNESS OF BREATH: 0

## 2023-07-07 ASSESSMENT — ANXIETY QUESTIONNAIRES
7. FEELING AFRAID AS IF SOMETHING AWFUL MIGHT HAPPEN: NOT AT ALL
IF YOU CHECKED OFF ANY PROBLEMS ON THIS QUESTIONNAIRE, HOW DIFFICULT HAVE THESE PROBLEMS MADE IT FOR YOU TO DO YOUR WORK, TAKE CARE OF THINGS AT HOME, OR GET ALONG WITH OTHER PEOPLE: NOT DIFFICULT AT ALL
GAD7 TOTAL SCORE: 0
2. NOT BEING ABLE TO STOP OR CONTROL WORRYING: NOT AT ALL
GAD7 TOTAL SCORE: 0
5. BEING SO RESTLESS THAT IT IS HARD TO SIT STILL: NOT AT ALL
1. FEELING NERVOUS, ANXIOUS, OR ON EDGE: NOT AT ALL
4. TROUBLE RELAXING: NOT AT ALL
6. BECOMING EASILY ANNOYED OR IRRITABLE: NOT AT ALL
3. WORRYING TOO MUCH ABOUT DIFFERENT THINGS: NOT AT ALL

## 2023-07-07 ASSESSMENT — PATIENT HEALTH QUESTIONNAIRE - PHQ9
10. IF YOU CHECKED OFF ANY PROBLEMS, HOW DIFFICULT HAVE THESE PROBLEMS MADE IT FOR YOU TO DO YOUR WORK, TAKE CARE OF THINGS AT HOME, OR GET ALONG WITH OTHER PEOPLE: NOT DIFFICULT AT ALL
SUM OF ALL RESPONSES TO PHQ QUESTIONS 1-9: 0
SUM OF ALL RESPONSES TO PHQ QUESTIONS 1-9: 0

## 2023-07-07 NOTE — PROGRESS NOTES
SUBJECTIVE:   CC: Richard is an 57 year old who presents for preventative health visit.     Healthy Habits:     Getting at least 3 servings of Calcium per day:  Yes    Bi-annual eye exam:  Yes    Dental care twice a year:  Yes    Sleep apnea or symptoms of sleep apnea:  Sleep apnea    Diet:  Carbohydrate counting    Frequency of exercise:  4-5 days/week    Duration of exercise:  Greater than 60 minutes    Taking medications regularly:  Yes    Medication side effects:  None    Additional concerns today:  No    Discuss weight concerns- discuss semaglutide    Today's PHQ-9 Score:       7/7/2023     7:57 AM   PHQ-9 SCORE   PHQ-9 Total Score MyChart 0   PHQ-9 Total Score 0       Hypertension Follow-up      Do you check your blood pressure regularly outside of the clinic? Yes     Are you following a low salt diet? Yes    Are your blood pressures ever more than 140 on the top number (systolic) OR more   than 90 on the bottom number (diastolic), for example 140/90? No    Hyperlipidemia Follow-Up      Are you regularly taking any medication or supplement to lower your cholesterol?   Yes- rosuvastatin    Are you having muscle aches or other side effects that you think could be caused by your cholesterol lowering medication?  No      Social History     Tobacco Use     Smoking status: Never     Smokeless tobacco: Never   Substance Use Topics     Alcohol use: Yes     Alcohol/week: 4.0 standard drinks of alcohol     Types: 4 Standard drinks or equivalent per week     Comment: 4 drinks per week           7/7/2023     8:00 AM   Alcohol Use   Prescreen: >3 drinks/day or >7 drinks/week? No     Last PSA:   PSA   Date Value Ref Range Status   01/25/2021 2.29 0 - 4 ug/L Final     Comment:     Assay Method:  Chemiluminescence using Siemens Vista analyzer     Prostate Specific Antigen Screen   Date Value Ref Range Status   04/21/2022 3.61 0.00 - 4.00 ug/L Final       Reviewed orders with patient. Reviewed health maintenance and updated  "orders accordingly - Yes    Reviewed and updated as needed this visit by clinical staff   Tobacco  Allergies  Meds  Problems  Med Hx  Surg Hx  Fam Hx          Reviewed and updated as needed this visit by Provider   Tobacco  Allergies  Meds  Problems  Med Hx  Surg Hx  Fam Hx           Review of Systems   Constitutional: Negative for chills and fever.   HENT: Negative for congestion, ear pain, hearing loss and sore throat.    Eyes: Negative for pain and visual disturbance.   Respiratory: Negative for cough and shortness of breath.    Cardiovascular: Negative for chest pain, palpitations and peripheral edema.   Gastrointestinal: Negative for abdominal pain, constipation, diarrhea, heartburn, hematochezia and nausea.   Genitourinary: Positive for impotence. Negative for dysuria, frequency, genital sores, hematuria, penile discharge and urgency.   Musculoskeletal: Negative for arthralgias, joint swelling and myalgias.   Skin: Negative for rash.   Neurological: Negative for dizziness, weakness, headaches and paresthesias.   Psychiatric/Behavioral: Negative for mood changes. The patient is not nervous/anxious.        OBJECTIVE:   /80   Pulse 96   Temp 97.6  F (36.4  C)   Resp 18   Ht 1.702 m (5' 7\")   Wt 119.3 kg (263 lb)   SpO2 100%   BMI 41.19 kg/m    EXAM:  GENERAL: healthy, alert and no distress  EYES: Eyes grossly normal to inspection, PERRL and conjunctivae and sclerae normal  HENT: ear canals and TM's normal, nose and mouth without ulcers or lesions  NECK: no adenopathy, no asymmetry, masses, or scars and thyroid normal to palpation  RESP: lungs clear to auscultation - no rales, rhonchi or wheezes  BREAST: normal without masses, tenderness or nipple discharge and no palpable axillary masses or adenopathy  CV: regular rate and rhythm, normal S1 S2, no S3 or S4, no murmur, click or rub, no peripheral edema and peripheral pulses strong  ABDOMEN: soft, nontender, no hepatosplenomegaly, no " masses and bowel sounds normal   (male): normal male genitalia without lesions or urethral discharge, no hernia  MS: no gross musculoskeletal defects noted, no edema  SKIN: no suspicious lesions or rashes  NEURO: Normal strength and tone, mentation intact and speech normal  PSYCH: mentation appears normal, affect normal/bright  LYMPH: no cervical, supraclavicular, axillary, or inguinal adenopathy  RECTAL: declined exam  Lab Results   Component Value Date    A1C 6.2 (H) 07/07/2023      ASSESSMENT/PLAN:   Routine general medical examination at a health care facility      Hypertension goal BP (blood pressure) < 130/80  Controlled - continue medication(s).  - Albumin Random Urine Quantitative with Creat Ratio  - COMPREHENSIVE METABOLIC PANEL  - spironolactone (ALDACTONE) 50 MG tablet  Dispense: 90 tablet; Refill: 3  - losartan (COZAAR) 100 MG tablet  Dispense: 90 tablet; Refill: 3  - Albumin Random Urine Quantitative with Creat Ratio  - COMPREHENSIVE METABOLIC PANEL    Hyperlipidemia LDL goal <100  Controlled - continue medication(s).  - Lipid panel reflex to direct LDL Non-fasting  - rosuvastatin (CRESTOR) 5 MG tablet  Dispense: 90 tablet; Refill: 3  - Lipid panel reflex to direct LDL Non-fasting    Morbid obesity (H)   Has tried different diets, currently doing a no carb higher protein keto like diet and doing some exercise.  He is not having success with this and would like to try Wegovy (if covered):  - Semaglutide-Weight Management (WEGOVY) 0.25 MG/0.5ML pen  Dispense: 2 mL; Refill: 0  - Semaglutide-Weight Management (WEGOVY) 0.5 MG/0.5ML pen  Dispense: 2 mL; Refill: 0    Altitude sickness preventative measures  Daughter lives [place] and likes to take this prior to visiting her  - acetaZOLAMIDE (DIAMOX) 250 MG tablet  Dispense: 30 tablet; Refill: 1    Erectile dysfunction, unspecified erectile dysfunction type  Stop taking testosterone due to cost, he is open to trying Viagra again:  - sildenafil (VIAGRA)  "100 MG tablet  Dispense: 30 tablet; Refill: 11    Screen for colon cancer  - Colonoscopy Screening  Referral    Screening for prostate cancer  - PROSTATE SPEC ANTIGEN SCREEN  - PROSTATE SPEC ANTIGEN SCREEN    SHANAE (obstructive sleep apnea)  In need of new machine and prescription given  - CPAP Order for DME - ONLY FOR DME    Elevated glucose (prediabetic)  Previously elevated we will continue to monitor.  - Hemoglobin A1c  - Hemoglobin A1c      COUNSELING:  Reviewed preventive health counseling, as reflected in patient instructions      BMI:   Estimated body mass index is 41.19 kg/m  as calculated from the following:    Height as of this encounter: 1.702 m (5' 7\").    Weight as of this encounter: 119.3 kg (263 lb).   Weight management plan: Discussed healthy diet and exercise guidelines wegovy sent           reports that he has never smoked. He has never used smokeless tobacco.      No follow-ups on file.    Follow-up Visit   Expected date:  Jul 07, 2024 (Approximate)      Follow Up Appointment Details:     Follow-up with whom?: Me    Follow-Up for what?: Adult Preventive    Any Additional Chronic Condition Management?:  Hypertension  Hyperlipidemia       How?: In Person    Is this an as-needed follow-up?: No                       Jose Johnson MD     [address] [address]  [email or web address]     Office: [phone]     "

## 2023-07-07 NOTE — TELEPHONE ENCOUNTER
Prior Authorization Retail Medication Request    Medication/Dose: Prior authorization needed for Wegovy  ICD code (if different than what is on RX):    Previously Tried and Failed:    Rationale:      Insurance Name:  [employer]  Insurance ID:  [id number]      Pharmacy Information (if different than what is on RX)  Name:    Phone:

## 2023-07-11 NOTE — TELEPHONE ENCOUNTER
PRIOR AUTHORIZATION DENIED    Medication: WEGOVY 0.25 MG/0.5ML SC SOAJ  Insurance Company: [employer] - Phone [phone] Fax [phone]  Denial Date: 7/11/2023  Denial Rational:      Weight loss drugs are excluded from plan        Appeal Information: N/A

## 2023-07-12 RX ORDER — ROSUVASTATIN CALCIUM 20 MG/1
20 TABLET, COATED ORAL DAILY
Qty: 90 TABLET | Refills: 3 | Status: SHIPPED | OUTPATIENT
Start: 2023-07-12 | End: 2024-07-26

## 2023-07-12 NOTE — RESULT ENCOUNTER NOTE
Dear Richard,    Here is a summary of your recent test results:  -PSA (prostate specific antigen) test is normal.  This indicates a low likelihood of prostate cancer.  ADVISE: rechecking this in 1 year.  -Cholesterol levels are above your goal levels (less than 100).  ADVISE: Increasing the dose of your rosuvastatin to 20 mg daily.  (I sent in a new prescription and that can be filled when needed and you can take 4 of your 5 mg pills to use up your supply), a regular exercise program with at least 150 minutes of aerobic exercise per week, and eating a low saturated fat/low carbohydrate diet.  Also, you should recheck this fasting cholesterol panel in 3-6 months.  -Liver and gallbladder tests (ALT,AST, Alk phos,bilirubin) are normal.  -Kidney function (GFR) is normal.  -Sodium is normal.  -Potassium is normal.  -Calcium is normal.  -Glucose and A1c test is slight elevated and may be a sign of early diabetes (prediabetes). ADVISE:: eating a low carbohydrate diet, exercising, trying to lose weight (if necessary) and rechecking your glucose level in 12 months.  -Microalbumin (urine protein) test is normal.  ADVISE: rechecking this annually.    For additional lab test information, www.testing.com is a very good reference.    In addition, here is a list of due or overdue Health Maintenance reminders:  Colorectal Cancer Screening due on 08/03/2022    Please call us at [phone] (or use [employer]) to address the above recommendations if needed.           Thank you very much for trusting me and [facility].     Have a peaceful day.    Healthy regards,  Jose Johnson MD

## 2023-07-12 NOTE — TELEPHONE ENCOUNTER
Called #   Telephone Information:   Mobile [phone]       Advised pt on the information below     Patient stated an understanding and agreed with plan.      Tiffanie Madrid RN, BSN  [facility]

## 2023-07-12 NOTE — RESULT ENCOUNTER NOTE
Note to Staff: please call the patient to explain results and the recommendation to increase cholesterol medication to rosuvastatin 20 mg daily.

## 2023-11-19 ENCOUNTER — MYC REFILL (OUTPATIENT)
Dept: FAMILY MEDICINE | Facility: CLINIC | Age: 58
End: 2023-11-19
Payer: COMMERCIAL

## 2023-11-19 DIAGNOSIS — Z29.89 ALTITUDE SICKNESS PREVENTATIVE MEASURES: ICD-10-CM

## 2023-11-21 RX ORDER — ACETAZOLAMIDE 250 MG/1
250 TABLET ORAL 2 TIMES DAILY
Qty: 30 TABLET | Refills: 1 | Status: SHIPPED | OUTPATIENT
Start: 2023-11-21 | End: 2024-07-26

## 2023-12-22 ENCOUNTER — DOCUMENTATION ONLY (OUTPATIENT)
Dept: SLEEP MEDICINE | Facility: CLINIC | Age: 58
End: 2023-12-22
Payer: COMMERCIAL

## 2023-12-22 DIAGNOSIS — G47.33 OBSTRUCTIVE SLEEP APNEA (ADULT) (PEDIATRIC): Primary | ICD-10-CM

## 2023-12-22 NOTE — PROGRESS NOTES
Patient was offered choice of vendor and chose [facility].  Patient Richard Mcmahanerd was set up at [place] on December 22, 2023. Patient received a Resmed Airsense 10 Pressures were set at  8-20 cm H2O.   Patient s ramp is 5 cm H2O for Off and FLEX/EPR is EPR, 2.  Patient received a Paco Respironics Mask name: DREAMWEAR  Nasal mask size Medium, heated tubing and heated humidifier.  Patient has the following compliance requirements: using and visit requirements  Patient has a follow up on TBD with RICHARD CARO.    Merlene Mcghee

## 2024-06-07 ENCOUNTER — PATIENT OUTREACH (OUTPATIENT)
Dept: CARE COORDINATION | Facility: CLINIC | Age: 59
End: 2024-06-07
Payer: COMMERCIAL

## 2024-06-21 ENCOUNTER — PATIENT OUTREACH (OUTPATIENT)
Dept: CARE COORDINATION | Facility: CLINIC | Age: 59
End: 2024-06-21
Payer: COMMERCIAL

## 2024-07-19 DIAGNOSIS — I10 HYPERTENSION GOAL BP (BLOOD PRESSURE) < 130/80: ICD-10-CM

## 2024-07-19 RX ORDER — LOSARTAN POTASSIUM 100 MG/1
100 TABLET ORAL EVERY MORNING
Qty: 90 TABLET | Refills: 0 | Status: SHIPPED | OUTPATIENT
Start: 2024-07-19 | End: 2024-07-26

## 2024-07-23 DIAGNOSIS — I10 HYPERTENSION GOAL BP (BLOOD PRESSURE) < 130/80: ICD-10-CM

## 2024-07-24 RX ORDER — SPIRONOLACTONE 50 MG/1
50 TABLET, FILM COATED ORAL EVERY MORNING
Qty: 90 TABLET | Refills: 0 | Status: SHIPPED | OUTPATIENT
Start: 2024-07-24 | End: 2024-07-26

## 2024-07-26 ENCOUNTER — VIRTUAL VISIT (OUTPATIENT)
Dept: FAMILY MEDICINE | Facility: CLINIC | Age: 59
End: 2024-07-26
Payer: COMMERCIAL

## 2024-07-26 DIAGNOSIS — Z29.89 ALTITUDE SICKNESS PREVENTATIVE MEASURES: ICD-10-CM

## 2024-07-26 DIAGNOSIS — Z12.5 SCREENING FOR PROSTATE CANCER: ICD-10-CM

## 2024-07-26 DIAGNOSIS — N52.9 ERECTILE DYSFUNCTION, UNSPECIFIED ERECTILE DYSFUNCTION TYPE: ICD-10-CM

## 2024-07-26 DIAGNOSIS — E66.01 MORBID OBESITY (H): ICD-10-CM

## 2024-07-26 DIAGNOSIS — E78.5 HYPERLIPIDEMIA LDL GOAL <100: ICD-10-CM

## 2024-07-26 DIAGNOSIS — I10 HYPERTENSION GOAL BP (BLOOD PRESSURE) < 130/80: Primary | ICD-10-CM

## 2024-07-26 PROCEDURE — 99214 OFFICE O/P EST MOD 30 MIN: CPT | Mod: 95 | Performed by: FAMILY MEDICINE

## 2024-07-26 PROCEDURE — G2211 COMPLEX E/M VISIT ADD ON: HCPCS | Mod: 95 | Performed by: FAMILY MEDICINE

## 2024-07-26 RX ORDER — ACETAZOLAMIDE 250 MG/1
250 TABLET ORAL 2 TIMES DAILY
Qty: 30 TABLET | Refills: 1 | Status: SHIPPED | OUTPATIENT
Start: 2024-07-26

## 2024-07-26 RX ORDER — SPIRONOLACTONE 50 MG/1
50 TABLET, FILM COATED ORAL EVERY MORNING
Qty: 90 TABLET | Refills: 1 | Status: SHIPPED | OUTPATIENT
Start: 2024-07-26

## 2024-07-26 RX ORDER — LOSARTAN POTASSIUM 100 MG/1
100 TABLET ORAL EVERY MORNING
Qty: 90 TABLET | Refills: 1 | Status: SHIPPED | OUTPATIENT
Start: 2024-07-26

## 2024-07-26 RX ORDER — ROSUVASTATIN CALCIUM 20 MG/1
20 TABLET, COATED ORAL DAILY
Qty: 90 TABLET | Refills: 1 | Status: SHIPPED | OUTPATIENT
Start: 2024-07-26

## 2024-07-26 NOTE — PROGRESS NOTES
Richard is a 58 year old who is being evaluated via a billable video visit.    How would you like to obtain your AVS? MyChart  If the video visit is dropped, the invitation should be resent by: Text to cell phone: [phone]  Will anyone else be joining your video visit? No      Assessment & Plan   Hypertension goal BP (blood pressure) < 130/80  Controlled - continue medication.   - spironolactone (ALDACTONE) 50 MG tablet  Dispense: 90 tablet; Refill: 1  - losartan (COZAAR) 100 MG tablet  Dispense: 90 tablet; Refill: 1    Hyperlipidemia LDL goal <100  Controlled - continue medication.   - rosuvastatin (CRESTOR) 20 MG tablet  Dispense: 90 tablet; Refill: 1    Altitude sickness preventative measures  Preventative with move  - acetaZOLAMIDE (DIAMOX) 250 MG tablet  Dispense: 30 tablet; Refill: 1    The longitudinal plan of care for the diagnosis(es)/condition(s) as documented were addressed during this visit. Due to the added complexity in care, I will continue to support Richard in the subsequent management and with ongoing continuity of care.      No follow-ups on file.   Follow-up Visit   Expected date: Jul 26, 2024      Follow Up Appointment Details:     Follow-up with whom?: Other Primary Care Services    Follow-Up for what?: Lab Visit    How?: In Person                         Jose Johnson MD      [address] [address]  [email or web address]   Office: [phone]         Subjective   Richard is a 58 year old, presenting for the following health issues:  Recheck Medication        7/26/2024     1:11 PM   Additional Questions   Roomed by Mindi MAZARIEGOS CMA     History of Present Illness       Hyperlipidemia:  He presents for follow up of hyperlipidemia.   He is not taking medication to lower cholesterol. He is not having myalgia or other side effects to statin medications.    Hypertension: He presents for follow up of hypertension.  He does check blood pressure  regularly  outside of the clinic. Outpatient blood pressures have not been over 140/90. He follows a low salt diet.     He eats 4 or more servings of fruits and vegetables daily.He consumes 1 sweetened beverage(s) daily.He exercises with enough effort to increase his heart rate 30 to 60 minutes per day.  He exercises with enough effort to increase his heart rate 5 days per week.   He is taking medications regularly.       Objective    Vitals - Patient Reported  Systolic (Patient Reported): 118  Diastolic (Patient Reported): 72      Vitals:  No vitals were obtained today due to virtual visit.    Physical Exam       GENERAL: alert and no distress  EYES: Eyes grossly normal to inspection.  No discharge or erythema, or obvious scleral/conjunctival abnormalities.  RESP: No audible wheeze, cough, or visible cyanosis.    SKIN: Visible skin clear. No significant rash, abnormal pigmentation or lesions.  NEURO: Cranial nerves grossly intact.  Mentation and speech appropriate for age.  PSYCH: Appropriate affect, tone, and pace of words    Labs will be done fasting in the near future.      Video-Visit Details    Type of service:  Video Visit   Originating Location (pt. Location): Home    Distant Location (provider location):  On-site  Platform used for Video Visit: Fariba  Signed Electronically by: Richard Johnson MD

## 2024-09-21 ENCOUNTER — HEALTH MAINTENANCE LETTER (OUTPATIENT)
Age: 59
End: 2024-09-21

## 2025-07-08 DIAGNOSIS — I10 HYPERTENSION GOAL BP (BLOOD PRESSURE) < 130/80: ICD-10-CM

## 2025-07-08 RX ORDER — SPIRONOLACTONE 50 MG/1
50 TABLET, FILM COATED ORAL EVERY MORNING
Qty: 90 TABLET | Refills: 0 | Status: SHIPPED | OUTPATIENT
Start: 2025-07-08

## 2025-07-08 RX ORDER — LOSARTAN POTASSIUM 100 MG/1
100 TABLET ORAL EVERY MORNING
Qty: 90 TABLET | Refills: 0 | Status: SHIPPED | OUTPATIENT
Start: 2025-07-08

## 2025-07-08 NOTE — TELEPHONE ENCOUNTER
Medication check and Due for a Wellness visit,  please call and schedule. Only 1 refill has been sent.

## (undated) DEVICE — ENDO TRAP POLYP QUICK CATCH 710201

## (undated) DEVICE — ENDO SNARE POLYPECTOMY OVAL 15MM LOOP SD-240U-15

## (undated) DEVICE — KIT ENDO TURNOVER/PROCEDURE W/CLEAN A SCOPE LINERS 103888

## (undated) RX ORDER — FENTANYL CITRATE 50 UG/ML
INJECTION, SOLUTION INTRAMUSCULAR; INTRAVENOUS
Status: DISPENSED
Start: 2017-08-03